# Patient Record
Sex: FEMALE | Race: WHITE | Employment: OTHER | URBAN - METROPOLITAN AREA
[De-identification: names, ages, dates, MRNs, and addresses within clinical notes are randomized per-mention and may not be internally consistent; named-entity substitution may affect disease eponyms.]

---

## 2017-08-09 ENCOUNTER — HOSPITAL ENCOUNTER (OUTPATIENT)
Dept: MAMMOGRAPHY | Age: 52
Discharge: HOME | End: 2017-08-09
Admitting: INTERNAL MEDICINE

## 2017-08-09 DIAGNOSIS — Z12.31 VISIT FOR SCREENING MAMMOGRAM: ICD-10-CM

## 2017-12-07 ENCOUNTER — OFFICE VISIT (OUTPATIENT)
Dept: ORTHOPEDIC SURGERY | Age: 52
End: 2017-12-07

## 2017-12-07 VITALS — HEIGHT: 63 IN | BODY MASS INDEX: 26.58 KG/M2 | WEIGHT: 150 LBS

## 2017-12-07 DIAGNOSIS — M54.16 LUMBAR RADICULOPATHY: Primary | ICD-10-CM

## 2017-12-07 DIAGNOSIS — M25.552 PAIN OF BOTH HIP JOINTS: ICD-10-CM

## 2017-12-07 DIAGNOSIS — M25.551 PAIN OF BOTH HIP JOINTS: ICD-10-CM

## 2017-12-07 DIAGNOSIS — R52 PAIN: ICD-10-CM

## 2017-12-07 PROCEDURE — 99243 OFF/OP CNSLTJ NEW/EST LOW 30: CPT | Performed by: ORTHOPAEDIC SURGERY

## 2017-12-07 RX ORDER — SUMATRIPTAN 100 MG/1
TABLET, FILM COATED ORAL
COMMUNITY
Start: 2017-11-28

## 2017-12-07 RX ORDER — FLUTICASONE PROPIONATE 50 MCG
2 SPRAY, SUSPENSION (ML) NASAL
COMMUNITY
Start: 2016-11-17 | End: 2021-07-13

## 2017-12-07 RX ORDER — ALPRAZOLAM 1 MG/1
1 TABLET ORAL
COMMUNITY
Start: 2017-07-06 | End: 2020-02-25

## 2017-12-07 RX ORDER — IBUPROFEN 600 MG/1
TABLET ORAL
COMMUNITY
Start: 2017-11-28 | End: 2020-09-14

## 2017-12-07 NOTE — PROGRESS NOTES
coordination. There is no weakness or sensory deficit. Left Hip Examination:    Inspection:  No erythema swelling or signs of infection    Palpation:  Tenderness to palpation of the posterior gluteus and piriformis muscles and ischial tuberosity    Range of Motion:  Full range of motion, forward flexion to 110 internal rotation to 20 external rotation to 50. Strength:  5/5 hip flexion and abduction and adduction    Special Tests:  Negative Sara's test.  Negative figure 4. Painful straight leg raise. negative strenchfield test    Skin: There are no rashes, ulcerations or lesions. Gait: Normal    Reflex 2+ patellar    Additional Comments:   Her lower back is tender to palpation. Ankle dorsiflexion and plantarflexion are intact sensation in her foot is intact    Additional Examinations:         Left Lower Extremity: Examination of the left lower extremity does not show any tenderness, deformity or injury. Range of motion is full with pain at terminal ends of internal rotation. There is no gross instability. There are no rashes, ulcerations or lesions. Strength and tone are normal.    Radiology:     X-rays obtained and reviewed in office:  Views 2  Location AP pelvis and lateral left hip  Impression there are no signs of arthritis fractures or dislocations in her hip. There does appear to be significant degenerative joint disease in her spine         Assessment :  Left greater than right Hip pain, which is likely coming from her spine    Impression:  Encounter Diagnoses   Name Primary?  Pain     Lumbar radiculopathy Yes       Office Procedures:  Orders Placed This Encounter   Procedures    Hip 2-3 Vw W Pelvis Left       Treatment Plan: We reviewed the diagnosis and treatment options. We recommended a course of therapeutic exercises, which she will continue as she is already doing physical therapy. She would not like to take any anti-inflammatory medications.   We discussed that her hip x-rays look normal and don't need any kind of surgery or intervention. We also discussed the x-ray is not the most sensitive test and that we could get an MRI of her hip if she would like. We will also like her to see on our spine doctors and evaluated. We will put in for an MRI of her hips and spine.   She is to get a spine MRI before seeing the spine doctors she will come back to see us if she gets a hip MRI to go over with her

## 2017-12-08 ENCOUNTER — TELEPHONE (OUTPATIENT)
Dept: ORTHOPEDIC SURGERY | Age: 52
End: 2017-12-08

## 2018-03-30 ENCOUNTER — TELEPHONE (OUTPATIENT)
Dept: ORTHOPEDIC SURGERY | Age: 53
End: 2018-03-30

## 2019-03-18 ENCOUNTER — HOSPITAL ENCOUNTER (OUTPATIENT)
Dept: WOMENS IMAGING | Age: 54
Discharge: HOME | End: 2019-03-18
Payer: COMMERCIAL

## 2019-03-18 DIAGNOSIS — Z12.31 ENCOUNTER FOR SCREENING MAMMOGRAM FOR MALIGNANT NEOPLASM OF BREAST: ICD-10-CM

## 2019-03-18 PROCEDURE — 77063 BREAST TOMOSYNTHESIS BI: CPT

## 2019-03-18 PROCEDURE — 77067 SCR MAMMO BI INCL CAD: CPT

## 2019-07-15 ENCOUNTER — TELEPHONE (OUTPATIENT)
Dept: ORTHOPEDIC SURGERY | Age: 54
End: 2019-07-15

## 2019-07-17 ENCOUNTER — TELEPHONE (OUTPATIENT)
Dept: ORTHOPEDIC SURGERY | Age: 54
End: 2019-07-17

## 2019-10-07 ENCOUNTER — HOSPITAL ENCOUNTER (EMERGENCY)
Age: 54
Discharge: HOME | End: 2019-10-07
Payer: COMMERCIAL

## 2019-10-07 VITALS
RESPIRATION RATE: 12 BRPM | WEIGHT: 159 LBS | OXYGEN SATURATION: 98 % | HEIGHT: 63 IN | BODY MASS INDEX: 28.17 KG/M2 | DIASTOLIC BLOOD PRESSURE: 90 MMHG | HEART RATE: 96 BPM | TEMPERATURE: 98.6 F | SYSTOLIC BLOOD PRESSURE: 155 MMHG

## 2019-10-07 DIAGNOSIS — M54.2 CHRONIC NECK PAIN: ICD-10-CM

## 2019-10-07 DIAGNOSIS — G89.29 CHRONIC NECK PAIN: ICD-10-CM

## 2019-10-07 DIAGNOSIS — S16.1XXA ACUTE STRAIN OF NECK MUSCLE, INITIAL ENCOUNTER: Primary | ICD-10-CM

## 2019-10-07 PROCEDURE — 6360000002 HC RX W HCPCS: Performed by: NURSE PRACTITIONER

## 2019-10-07 PROCEDURE — 96372 THER/PROPH/DIAG INJ SC/IM: CPT

## 2019-10-07 PROCEDURE — 99283 EMERGENCY DEPT VISIT LOW MDM: CPT

## 2019-10-07 RX ORDER — PAROXETINE 10 MG/1
10 TABLET, FILM COATED ORAL EVERY MORNING
COMMUNITY
End: 2020-02-24

## 2019-10-07 RX ORDER — ORPHENADRINE CITRATE 30 MG/ML
60 INJECTION INTRAMUSCULAR; INTRAVENOUS ONCE
Status: COMPLETED | OUTPATIENT
Start: 2019-10-07 | End: 2019-10-07

## 2019-10-07 RX ORDER — KETOROLAC TROMETHAMINE 30 MG/ML
30 INJECTION, SOLUTION INTRAMUSCULAR; INTRAVENOUS ONCE
Status: COMPLETED | OUTPATIENT
Start: 2019-10-07 | End: 2019-10-07

## 2019-10-07 RX ORDER — HYDROCODONE BITARTRATE AND ACETAMINOPHEN 5; 325 MG/1; MG/1
1 TABLET ORAL EVERY 6 HOURS PRN
Qty: 12 TABLET | Refills: 0 | Status: SHIPPED | OUTPATIENT
Start: 2019-10-07 | End: 2019-10-10

## 2019-10-07 RX ORDER — CYCLOBENZAPRINE HCL 10 MG
10 TABLET ORAL 3 TIMES DAILY PRN
Qty: 12 TABLET | Refills: 0 | Status: SHIPPED | OUTPATIENT
Start: 2019-10-07 | End: 2019-10-17

## 2019-10-07 RX ADMIN — KETOROLAC TROMETHAMINE 30 MG: 30 INJECTION, SOLUTION INTRAMUSCULAR at 17:15

## 2019-10-07 RX ADMIN — ORPHENADRINE CITRATE 60 MG: 30 INJECTION INTRAMUSCULAR; INTRAVENOUS at 17:15

## 2019-10-07 ASSESSMENT — PAIN DESCRIPTION - ONSET: ONSET: ON-GOING

## 2019-10-07 ASSESSMENT — PAIN DESCRIPTION - LOCATION
LOCATION: NECK
LOCATION: NECK

## 2019-10-07 ASSESSMENT — PAIN DESCRIPTION - PROGRESSION: CLINICAL_PROGRESSION: NOT CHANGED

## 2019-10-07 ASSESSMENT — PAIN DESCRIPTION - DESCRIPTORS: DESCRIPTORS: ACHING

## 2019-10-07 ASSESSMENT — PAIN SCALES - GENERAL
PAINLEVEL_OUTOF10: 9

## 2019-10-07 ASSESSMENT — PAIN DESCRIPTION - FREQUENCY: FREQUENCY: CONTINUOUS

## 2019-10-07 ASSESSMENT — PAIN DESCRIPTION - PAIN TYPE: TYPE: ACUTE PAIN

## 2019-10-15 ENCOUNTER — OFFICE VISIT (OUTPATIENT)
Dept: ORTHOPEDIC SURGERY | Age: 54
End: 2019-10-15
Payer: COMMERCIAL

## 2019-10-15 VITALS
HEART RATE: 96 BPM | SYSTOLIC BLOOD PRESSURE: 132 MMHG | DIASTOLIC BLOOD PRESSURE: 72 MMHG | WEIGHT: 158.95 LBS | BODY MASS INDEX: 28.16 KG/M2 | HEIGHT: 63 IN

## 2019-10-15 DIAGNOSIS — M72.2 PLANTAR FASCIITIS, BILATERAL: ICD-10-CM

## 2019-10-15 DIAGNOSIS — M79.671 PAIN OF RIGHT HEEL: Primary | ICD-10-CM

## 2019-10-15 DIAGNOSIS — M79.672 PAIN OF LEFT HEEL: ICD-10-CM

## 2019-10-15 PROCEDURE — 99203 OFFICE O/P NEW LOW 30 MIN: CPT | Performed by: PODIATRIST

## 2019-10-15 PROCEDURE — L3040 FT ARCH SUPRT PREMOLD LONGIT: HCPCS | Performed by: PODIATRIST

## 2019-10-15 RX ORDER — IBUPROFEN 600 MG/1
600 TABLET ORAL
COMMUNITY
Start: 2017-11-28 | End: 2020-02-24

## 2019-10-15 RX ORDER — SUMATRIPTAN 100 MG/1
TABLET, FILM COATED ORAL
COMMUNITY
Start: 2017-11-28 | End: 2020-02-24

## 2019-11-07 ENCOUNTER — TELEPHONE (OUTPATIENT)
Dept: ORTHOPEDIC SURGERY | Age: 54
End: 2019-11-07

## 2020-02-24 ENCOUNTER — OFFICE VISIT (OUTPATIENT)
Dept: FAMILY MEDICINE CLINIC | Age: 55
End: 2020-02-24
Payer: COMMERCIAL

## 2020-02-24 VITALS
OXYGEN SATURATION: 99 % | TEMPERATURE: 98.2 F | WEIGHT: 166 LBS | HEIGHT: 63 IN | DIASTOLIC BLOOD PRESSURE: 60 MMHG | HEART RATE: 108 BPM | SYSTOLIC BLOOD PRESSURE: 108 MMHG | BODY MASS INDEX: 29.41 KG/M2

## 2020-02-24 PROBLEM — Z79.899 CHRONICALLY ON BENZODIAZEPINE THERAPY: Status: ACTIVE | Noted: 2020-02-24

## 2020-02-24 PROBLEM — M79.7 FIBROMYALGIA: Status: ACTIVE | Noted: 2020-02-24

## 2020-02-24 PROCEDURE — 99204 OFFICE O/P NEW MOD 45 MIN: CPT | Performed by: FAMILY MEDICINE

## 2020-02-24 PROCEDURE — G0444 DEPRESSION SCREEN ANNUAL: HCPCS | Performed by: FAMILY MEDICINE

## 2020-02-24 PROCEDURE — G8431 POS CLIN DEPRES SCRN F/U DOC: HCPCS | Performed by: FAMILY MEDICINE

## 2020-02-24 RX ORDER — PAROXETINE HYDROCHLORIDE 40 MG/1
TABLET, FILM COATED ORAL
COMMUNITY
Start: 2020-01-12 | End: 2020-09-14

## 2020-02-24 RX ORDER — METOPROLOL SUCCINATE 25 MG/1
TABLET, EXTENDED RELEASE ORAL
COMMUNITY
Start: 2020-01-12 | End: 2020-02-24

## 2020-02-24 SDOH — ECONOMIC STABILITY: TRANSPORTATION INSECURITY
IN THE PAST 12 MONTHS, HAS THE LACK OF TRANSPORTATION KEPT YOU FROM MEDICAL APPOINTMENTS OR FROM GETTING MEDICATIONS?: NO

## 2020-02-24 SDOH — HEALTH STABILITY: MENTAL HEALTH
STRESS IS WHEN SOMEONE FEELS TENSE, NERVOUS, ANXIOUS, OR CAN'T SLEEP AT NIGHT BECAUSE THEIR MIND IS TROUBLED. HOW STRESSED ARE YOU?: VERY MUCH

## 2020-02-24 SDOH — ECONOMIC STABILITY: TRANSPORTATION INSECURITY
IN THE PAST 12 MONTHS, HAS LACK OF TRANSPORTATION KEPT YOU FROM MEETINGS, WORK, OR FROM GETTING THINGS NEEDED FOR DAILY LIVING?: NO

## 2020-02-24 SDOH — ECONOMIC STABILITY: FOOD INSECURITY: WITHIN THE PAST 12 MONTHS, YOU WORRIED THAT YOUR FOOD WOULD RUN OUT BEFORE YOU GOT MONEY TO BUY MORE.: NEVER TRUE

## 2020-02-24 SDOH — HEALTH STABILITY: PHYSICAL HEALTH: ON AVERAGE, HOW MANY DAYS PER WEEK DO YOU ENGAGE IN MODERATE TO STRENUOUS EXERCISE (LIKE A BRISK WALK)?: 0 DAYS

## 2020-02-24 SDOH — HEALTH STABILITY: PHYSICAL HEALTH: ON AVERAGE, HOW MANY MINUTES DO YOU ENGAGE IN EXERCISE AT THIS LEVEL?: 0 MIN

## 2020-02-24 SDOH — ECONOMIC STABILITY: FOOD INSECURITY: WITHIN THE PAST 12 MONTHS, THE FOOD YOU BOUGHT JUST DIDN'T LAST AND YOU DIDN'T HAVE MONEY TO GET MORE.: NEVER TRUE

## 2020-02-24 SDOH — ECONOMIC STABILITY: INCOME INSECURITY: HOW HARD IS IT FOR YOU TO PAY FOR THE VERY BASICS LIKE FOOD, HOUSING, MEDICAL CARE, AND HEATING?: NOT HARD AT ALL

## 2020-02-24 ASSESSMENT — ANXIETY QUESTIONNAIRES
GAD7 TOTAL SCORE: 10
2. NOT BEING ABLE TO STOP OR CONTROL WORRYING: 1-SEVERAL DAYS
7. FEELING AFRAID AS IF SOMETHING AWFUL MIGHT HAPPEN: 2-OVER HALF THE DAYS
4. TROUBLE RELAXING: 1-SEVERAL DAYS
5. BEING SO RESTLESS THAT IT IS HARD TO SIT STILL: 0-NOT AT ALL
3. WORRYING TOO MUCH ABOUT DIFFERENT THINGS: 1-SEVERAL DAYS
6. BECOMING EASILY ANNOYED OR IRRITABLE: 2-OVER HALF THE DAYS
1. FEELING NERVOUS, ANXIOUS, OR ON EDGE: 3-NEARLY EVERY DAY

## 2020-02-24 ASSESSMENT — ENCOUNTER SYMPTOMS
WHEEZING: 0
BLOOD IN STOOL: 0
ABDOMINAL PAIN: 1
DIARRHEA: 1
RHINORRHEA: 0
CONSTIPATION: 1
COLOR CHANGE: 0
SORE THROAT: 0
SHORTNESS OF BREATH: 0
BACK PAIN: 1

## 2020-02-24 ASSESSMENT — PATIENT HEALTH QUESTIONNAIRE - PHQ9
8. MOVING OR SPEAKING SO SLOWLY THAT OTHER PEOPLE COULD HAVE NOTICED. OR THE OPPOSITE, BEING SO FIGETY OR RESTLESS THAT YOU HAVE BEEN MOVING AROUND A LOT MORE THAN USUAL: 1
10. IF YOU CHECKED OFF ANY PROBLEMS, HOW DIFFICULT HAVE THESE PROBLEMS MADE IT FOR YOU TO DO YOUR WORK, TAKE CARE OF THINGS AT HOME, OR GET ALONG WITH OTHER PEOPLE: 3
1. LITTLE INTEREST OR PLEASURE IN DOING THINGS: 3
SUM OF ALL RESPONSES TO PHQ QUESTIONS 1-9: 21
4. FEELING TIRED OR HAVING LITTLE ENERGY: 3
SUM OF ALL RESPONSES TO PHQ QUESTIONS 1-9: 21
SUM OF ALL RESPONSES TO PHQ9 QUESTIONS 1 & 2: 6
5. POOR APPETITE OR OVEREATING: 3
2. FEELING DOWN, DEPRESSED OR HOPELESS: 3
7. TROUBLE CONCENTRATING ON THINGS, SUCH AS READING THE NEWSPAPER OR WATCHING TELEVISION: 2
6. FEELING BAD ABOUT YOURSELF - OR THAT YOU ARE A FAILURE OR HAVE LET YOURSELF OR YOUR FAMILY DOWN: 3
3. TROUBLE FALLING OR STAYING ASLEEP: 3
9. THOUGHTS THAT YOU WOULD BE BETTER OFF DEAD, OR OF HURTING YOURSELF: 0

## 2020-02-24 NOTE — PROGRESS NOTES
deficit. Psychiatric:         Mood and Affect: Mood is anxious and depressed. Affect is labile. Speech: Speech is rapid and pressured and tangential.         Behavior: Behavior is hyperactive. Thought Content: Thought content does not include homicidal or suicidal ideation. ASSESSMENT/PLAN:  Ramez Nolan is a 63 y/o female here to establish care and for chronic medical problems of anxiety/depression and PTSD, insomnia, IBS with diarrhea and constipation, chronic back pain and fibromyalgia, and chronic migraines without arua. 1. Encounter to establish care  -chart reviewed, health maintenance updated, history and physical performed, chronic conditions addressed    2. Anxiety and depression  3. Positive depression screening  4. Post traumatic stress disorder  5. Psychophysiological insomnia  6. Chronically on benzodiazepine therapy  7. Encounter for drug screening  - Positive Screen for Clinical Depression with a Documented Follow-up Plan   On the basis of positive PHQ-9 screening (PHQ-9 Total Score: 21), the following plan was implemented: medication prescribed: Paxil- 40 mg daily for mood and Xanax 1 mg HS to be Rx- patient will call for any significant medication side effects or worsening symptoms of depression.   Patient will follow-up in 3 month(s) with PCP.  - DRUG SCREEN MULTI URINE  -continue paxil 40 mg daily; consider behavioral health in future  -CDA/UDS today  -continue Xanax 1 mg HS for sleep if UDS appropriate  PDMP Monitoring:    Last PDMP [address] as Reviewed [facility]):  Review User Review Instant Review Result   Jen Jin 2/24/2020  9:53 PM Reviewed PDMP [1]     Last Controlled Substance Monitoring Documentation      Office Visit from 2/24/2020 in [address] [place] [address] [place]   Periodic Controlled Substance Monitoring  Possible medication side effects, risk of tolerance/dependence & alternative treatments discussed., No signs of potential drug abuse or

## 2020-02-24 NOTE — LETTER
disease. Overdose or dangerous interactions with alcohol and other medications may occur, leading to death. Hyperalgesia may develop, in which patients receiving opioids for the treatment of pain may actually become more sensitive to certain painful stimuli, and in some cases, experience pain from ordinarily non-painful stimuli. Women between the ages of 14-53 who could become pregnant should carefully weigh the risks and benefits of opioids with their physicians, as these medications increase the risk of pregnancy complications, including miscarriage,  delivery and stillbirth. It is also possible for babies to be born addicted to opioids. Opioid dependence withdrawal symptoms may include; feelings of uneasiness, increased pain, irritability, belly pain, diarrhea, sweats and goose-flesh. Benzodiazepines and non-benzodiazepine sleep medications: These medications can lead to problems such as addiction/dependence, sedation, fatigue, lightheadedness, dizziness, incoordination, falls, depression, hallucinations, and impaired judgment, memory and concentration. The ability to drive and operate machinery may also be affected. Abnormal sleep-related behaviors have been reported, including sleep walking, driving, making telephone calls, eating, or having sex while not fully awake. These medications can suppress breathing and worsen sleep apnea, particularly when combined with alcohol or other sedating medications, potentially leading to death. Dependence withdrawal symptoms may include tremors, anxiety, hallucinations and seizures. Stimulants:  Common adverse effects include addiction/dependence, increased blood pressure and heart rate, decreased appetite, nausea, involuntary weight loss, insomnia, irritability, and headaches.   These risks may increase when these medications are combined with other stimulants, such as caffeine pills or energy drinks, certain weight loss supplements and oral decongestants. Dependence withdrawal symptoms may include depressed mood, loss of interest, suicidal thoughts, anxiety, fatigue, appetite changes and agitation. Testosterone replacement therapy:  Potential side effects include increased risk of stroke and heart attack, blood clots, increased blood pressure, increased cholesterol, enlarged prostate, sleep apnea, irritability/aggression and other mood disorders, and decreased fertility. Other:     1. I understand that I have the following responsibilities:  · I will take medications at the dose and frequency prescribed. · I will not increase or change how I take my medications without the approval of the health care provider who signs this Medication Agreement. · I will arrange for refills at the prescribed interval ONLY during regular office hours. I will not ask for refills earlier than agreed, after-hours, on holidays or on weekends. · I will obtain all refills for these medications at  [place] [address], [address] [phone] (Phone)     ·  with full consent for my provider and pharmacist to exchange information in writing or verbally. · I will not request any pain medications or controlled substances from other providers and will inform this provider of all other medications I am taking. · I will inform my other health care providers that I am taking these medications and of the existence of this [id number]. In the event of an emergency, I will provide the same information to the emergency department providers. · I will protect my prescriptions and medications. I understand that lost or misplaced prescriptions will not be replaced. · I will keep medications only for my own use and will not share them with others. I will keep all medications away from children.   · I agree to participate in any medical, psychological or psychiatric

## 2020-02-24 NOTE — PATIENT INSTRUCTIONS
Get urine drug screen  Sign controlled drug agreement  Continue current medications  Follow up in 3 months for med check/follow up

## 2020-02-25 LAB
AMPHETAMINE SCREEN, URINE: NORMAL
BARBITURATE SCREEN URINE: NORMAL
BENZODIAZEPINE SCREEN, URINE: NORMAL
CANNABINOID SCREEN URINE: NORMAL
COCAINE METABOLITE SCREEN URINE: NORMAL
Lab: NORMAL
METHADONE SCREEN, URINE: NORMAL
OPIATE SCREEN URINE: NORMAL
OXYCODONE URINE: NORMAL
PH UA: 5
PHENCYCLIDINE SCREEN URINE: NORMAL
PROPOXYPHENE SCREEN: NORMAL

## 2020-02-25 RX ORDER — ALPRAZOLAM 1 MG/1
1 TABLET ORAL NIGHTLY PRN
Qty: 30 TABLET | Refills: 0 | Status: SHIPPED | OUTPATIENT
Start: 2020-02-25 | End: 2020-03-26

## 2020-02-25 RX ORDER — ALPRAZOLAM 1 MG/1
1 TABLET ORAL NIGHTLY PRN
Qty: 30 TABLET | Refills: 0 | Status: CANCELLED | OUTPATIENT
Start: 2020-02-25 | End: 2020-03-26

## 2020-04-27 ENCOUNTER — VIRTUAL VISIT (OUTPATIENT)
Dept: FAMILY MEDICINE CLINIC | Age: 55
End: 2020-04-27
Payer: COMMERCIAL

## 2020-04-27 ENCOUNTER — TELEPHONE (OUTPATIENT)
Dept: FAMILY MEDICINE CLINIC | Age: 55
End: 2020-04-27

## 2020-04-27 VITALS — BODY MASS INDEX: 27.83 KG/M2 | TEMPERATURE: 97.6 F | HEIGHT: 64 IN | WEIGHT: 163 LBS

## 2020-04-27 PROCEDURE — 99213 OFFICE O/P EST LOW 20 MIN: CPT | Performed by: FAMILY MEDICINE

## 2020-04-27 RX ORDER — ALPRAZOLAM 1 MG/1
TABLET ORAL
COMMUNITY
Start: 2020-04-21 | End: 2020-06-25

## 2020-04-27 RX ORDER — PAROXETINE 10 MG/1
TABLET, FILM COATED ORAL
Qty: 30 TABLET | Refills: 3 | Status: SHIPPED | OUTPATIENT
Start: 2020-04-27 | End: 2020-09-14

## 2020-04-27 RX ORDER — ERYTHROMYCIN 5 MG/G
OINTMENT OPHTHALMIC
Qty: 3.5 G | Refills: 0 | Status: SHIPPED | OUTPATIENT
Start: 2020-04-27 | End: 2020-05-07

## 2020-04-27 ASSESSMENT — ENCOUNTER SYMPTOMS
VOMITING: 0
ABDOMINAL PAIN: 0
SORE THROAT: 0
CONSTIPATION: 0
EYE REDNESS: 1
DIARRHEA: 0
EYE ITCHING: 1
RHINORRHEA: 0
EYE DISCHARGE: 1
NAUSEA: 0
SHORTNESS OF BREATH: 0

## 2020-04-27 NOTE — PROGRESS NOTES
activity     Days per week: 0 days     Minutes per session: 0 min    Stress: Very much   Relationships    Social connections     Talks on phone: Not on file     Gets together: Not on file     Attends Restoration[employer] service: Not on file     Active member of club or organization: Not on file     Attends meetings of clubs or organizations: Not on file     Relationship status: Not on file    Intimate partner violence     Fear of current or ex partner: Not on file     Emotionally abused: Not on file     Physically abused: Not on file     Forced sexual activity: Not on file   Other Topics Concern    Not on file   Social History Narrative    Suicide in family    [de-identified], aunts, cousin    Other cousin 4 attempts    Sister made 3 attempts    Relatives committed to amairani     38 years. With  39 years    Son 39 y/o Linsey Maradiaga         There is no immunization history on file for this patient. Past medical, surgical, and social history reviewed and updated. Medications, immunizations, and allergies reviewed and updated     Review of Systems   Constitutional: Negative for appetite change, chills, fatigue and fever. HENT: Negative for congestion, rhinorrhea and sore throat. Eyes: Positive for discharge, redness and itching. Negative for visual disturbance. Respiratory: Negative for shortness of breath. Cardiovascular: Negative for chest pain. Gastrointestinal: Negative for abdominal pain, constipation, diarrhea, nausea and vomiting. Genitourinary: Negative for dysuria and hematuria. Skin: Negative for rash. Psychiatric/Behavioral: Positive for agitation, dysphoric mood and sleep disturbance. The patient is nervous/anxious. OBJECTIVE:  Temp 97.6 °F (36.4 °C) Comment: patient reported  Ht 5' 3.5\" (1.613 m) Comment: patient reported  Wt 163 lb (73.9 kg) Comment: patient reported  BMI 28.42 kg/m²     Physical Exam  Constitutional:       Appearance: She is ill-appearing.    HENT:      Head:

## 2020-05-22 ENCOUNTER — TELEPHONE (OUTPATIENT)
Dept: FAMILY MEDICINE CLINIC | Age: 55
End: 2020-05-22

## 2020-06-17 ENCOUNTER — TELEPHONE (OUTPATIENT)
Dept: FAMILY MEDICINE CLINIC | Age: 55
End: 2020-06-17

## 2020-06-25 RX ORDER — ALPRAZOLAM 1 MG/1
TABLET ORAL
Qty: 30 TABLET | Refills: 0 | Status: SHIPPED | OUTPATIENT
Start: 2020-06-25 | End: 2020-08-24

## 2020-06-26 ENCOUNTER — TELEPHONE (OUTPATIENT)
Dept: FAMILY MEDICINE CLINIC | Age: 55
End: 2020-06-26

## 2020-07-22 ENCOUNTER — TELEPHONE (OUTPATIENT)
Dept: FAMILY MEDICINE CLINIC | Age: 55
End: 2020-07-22

## 2020-07-22 NOTE — TELEPHONE ENCOUNTER
Patient called in and is requesting a call back to get rescheduled. Patient did not know that she had an appointment scheduled yesterday and was not able to wait on hold to get through to the office. Please advise.

## 2020-07-23 ENCOUNTER — VIRTUAL VISIT (OUTPATIENT)
Dept: FAMILY MEDICINE CLINIC | Age: 55
End: 2020-07-23
Payer: COMMERCIAL

## 2020-07-23 PROCEDURE — 99213 OFFICE O/P EST LOW 20 MIN: CPT | Performed by: PHYSICIAN ASSISTANT

## 2020-07-23 RX ORDER — GUAIFENESIN 600 MG/1
TABLET, EXTENDED RELEASE ORAL
Qty: 30 TABLET | Refills: 0 | Status: SHIPPED | OUTPATIENT
Start: 2020-07-23 | End: 2021-07-13

## 2020-07-23 RX ORDER — DOXYCYCLINE HYCLATE 100 MG/1
100 CAPSULE ORAL 2 TIMES DAILY
Qty: 14 CAPSULE | Refills: 0 | Status: SHIPPED | OUTPATIENT
Start: 2020-07-23 | End: 2020-07-30

## 2020-07-23 NOTE — PROGRESS NOTES
2020    TELEHEALTH EVALUATION -- Audio/Visual (During [facility]-88 public health emergency)    HPI:    Mariano Smith (:  1965) has requested an audio/video evaluation for the following concern(s): With acute on chronic sinusitis. Sinus headache, maxillary sinus pressure, mucous green nasal exudates, malaise and fatigue x10 days. Gets a sinus infection about once a year and usually prescribed Clare Seen but one usually does not cure it. States her body is medication resistant. Tried sinus meds OTC or her migraine med. With ear pressure, has a dry itchy cough. Review of Systems No fever or chills. No vision changes. No dyspnea or chest pain. Prior to Visit Medications    Medication Sig Taking? Authorizing Provider   doxycycline hyclate (VIBRAMYCIN) 100 MG capsule Take 1 capsule by mouth 2 times daily for 7 days Yes THELMA Do   guaiFENesin (MUCINEX) 600 MG extended release tablet 1-2 tabs every 12 hours as needed for congestion. Increase water intake with this medication. Yes [address], [address]   ALPRAZolam (XANAX) 1 MG tablet TAKE 1 TABLET BY MOUTH EVERY NIGHT AS NEEDED FOR SLEEP OR ANXIETY Yes Shayla Santizo., DO   PARoxetine (PAXIL) 10 MG tablet TAKE 1 TABLET DAILY WITH YOUR 40 MG TABLET FOR 50 MG TOTAL Yes Shayla Santizo., DO   PARoxetine (PAXIL) 40 MG tablet TK 1 T PO  QAM Yes Historical Provider, MD   fluticasone (FLONASE) 50 MCG/ACT nasal spray 2 sprays by Nasal route Yes Historical Provider, MD   SUMAtriptan (IMITREX) 100 MG tablet Take 1 tab at onset of migraine. Can take second tab 2 hours later if needed.  Yes Historical Provider, MD   ibuprofen (ADVIL;MOTRIN) 600 MG tablet TAKE 1 TABLET BY MOUTH EVERY 6 HOURS AS NEEDED FOR PAIN  Historical Provider, MD       Social History     Tobacco Use    Smoking status: Former Smoker     Types: Cigarettes     Start date: 1974     Last attempt to quit: 2004     Years since quittin.5    Smokeless tobacco: Never Used Substance Use Topics    Alcohol use: Not Currently    Drug use: Not Currently        Allergies   Allergen Reactions    Latex     Codeine Anaphylaxis    Lidocaine     Meperidine Anaphylaxis    Diphenhydramine Other (See Comments)     May be associated with pancreatitis attacks in this patient. Usually with higher doses    Iodinated Diagnostic Agents Nausea Only and Other (See Comments)     Bee stings, body aches      Prednisone     Trazodone Other (See Comments)     pancreatitis   ,   Past Medical History:   Diagnosis Date    Anxiety and depression     Degenerative disc disease, lumbar     IBS (irritable bowel syndrome)     Insomnia     Lumbar disc disease     Migraines     Post traumatic stress disorder    ,   Past Surgical History:   Procedure Laterality Date    HEEL SPUR SURGERY      LUMBAR DISCECTOMY      TOENAIL EXCISION      TONSILLECTOMY      TUBAL LIGATION         PHYSICAL EXAMINATION:    Patient-Reported Vitals 7/23/2020   Patient-Reported Weight 160 lb   Patient-Reported Height 5 4   Patient-Reported Temperature 98.4      Constitutional: [x] Appears well-developed and well-nourished [x] No apparent distress      [] Abnormal-   Mental status  [x] Alert and awake  [x] Oriented to person/place/time [x]Able to follow commands      Eyes:  EOM    [x]  Normal  [] Abnormal-  Sclera  [x]  Normal  [] Abnormal -         Discharge [x]  None visible  [] Abnormal -    HENT:   [x] Normocephalic, atraumatic. [x] Abnormal  Sounds nasally as if congested.   [x] Mouth/Throat: Mucous membranes are moist.     External Ears [x] Normal  [] Abnormal-     Neck: [x] No visualized mass     Pulmonary/Chest: [x] Respiratory effort normal.  [x] No visualized signs of difficulty breathing or respiratory distress        [] Abnormal-      Musculoskeletal:   [] Normal gait with no signs of ataxia         [x] Normal range of motion of neck        [] Abnormal-       Neurological:        [] No Facial Asymmetry (Cranial nerve 7 motor function) (limited exam to video visit)          [x] No gaze palsy        [] Abnormal-         Skin:        [x] No significant exanthematous lesions or discoloration noted on facial skin         [] Abnormal-            Psychiatric:       [x] Normal Affect [] No Hallucinations        [] Abnormal-     Other pertinent observable physical exam findings-tenderness elicited when she palpates maxillary sinuses. ASSESSMENT/PLAN:  1. Acute on chronic bacterial sinusitis with  Chronic allergic rhinitis  - start antibiotics and mucinex. - increase water intake  - suggested nasal saline rinses    2. HEALTH MAINTENANCE:  She explained her long history of medical issues including chronic back pain, mental health issues, pancreatitis,  and said she will obtain records. Has routine appt 8/18/2020. Return if symptoms worsen or fail to improve. Bethany Baron is a 62 y.o. female being evaluated by a Virtual Visit (video visit) encounter to address concerns as mentioned above. A caregiver was present when appropriate. Due to this being a TeleHealth encounter (During [facility] public health emergency), evaluation of the following organ systems was limited: Vitals/Constitutional/EENT/Resp/CV/GI//MS/Neuro/Skin/Heme-Lymph-Imm. Pursuant to the emergency declaration under the [facility], [address] [place] [address] authority and the [employer] and [employer] Act, this Virtual Visit was conducted with patient's (and/or legal guardian's) consent, to reduce the patient's risk of exposure to COVID-19 and provide necessary medical care. The patient (and/or legal guardian) has also been advised to contact this office for worsening conditions or problems, and seek emergency medical treatment and/or call 911 if deemed necessary.      Patient identification was verified at the start of the visit: Yes    Total time spent on this encounter: 30    Services were

## 2020-08-04 ENCOUNTER — TELEPHONE (OUTPATIENT)
Dept: FAMILY MEDICINE CLINIC | Age: 55
End: 2020-08-04

## 2020-08-04 NOTE — TELEPHONE ENCOUNTER
Pt called and states that she had a VV with Carmen on 7/23/20. She was prescribed doxycylcine for her sinus infection. She states that she has finished the script and it did not work. She is wanting to know if a zpak can be called in for her.  Please call pt to advise [phone]

## 2020-08-04 NOTE — TELEPHONE ENCOUNTER
Pt states she can't do an e vist or doxy now. She is getting ready to turn her phone in to be sent off to be fixed. She has no other way to do a visit. I asked if she could use someone else phone. She stated she couldn't. Now having dizziness. Do you want pt to be seen at [facility]?

## 2020-08-04 NOTE — TELEPHONE ENCOUNTER
Patient returned call. She has a sinus disease and gets the sinus infections once a year. She now has the following symptoms:  Raspy voice, sinus pressure, headache, coughing, yellow mucus, dizziness. She is requesting a Z nilsa as that has helped in the past.  She is medication resistant and some medications will not  Work. She said you could call her pharmacy and confirm that receiving the Z nilsa has been used in the past.   Another appointment for Z nilsa? Call on cell phone within 30 minutes, or leave message on home phone after 30 minutes. She is having cell phone difficulty.

## 2020-08-24 RX ORDER — ALPRAZOLAM 1 MG/1
TABLET ORAL
Qty: 30 TABLET | Refills: 2 | Status: SHIPPED | OUTPATIENT
Start: 2020-08-24 | End: 2020-12-08

## 2020-08-24 NOTE — TELEPHONE ENCOUNTER
Mariano Smiht is requesting refill(s) alprazolam  Last OV 7/23/20 (pertaining to medication)  LR 6/25/20 (per medication requested)  Next office visit scheduled or attempted No   If no, reason:

## 2020-09-14 ENCOUNTER — VIRTUAL VISIT (OUTPATIENT)
Dept: FAMILY MEDICINE CLINIC | Age: 55
End: 2020-09-14
Payer: COMMERCIAL

## 2020-09-14 PROCEDURE — 99214 OFFICE O/P EST MOD 30 MIN: CPT | Performed by: FAMILY MEDICINE

## 2020-09-14 PROCEDURE — G0444 DEPRESSION SCREEN ANNUAL: HCPCS | Performed by: FAMILY MEDICINE

## 2020-09-14 PROCEDURE — G8431 POS CLIN DEPRES SCRN F/U DOC: HCPCS | Performed by: FAMILY MEDICINE

## 2020-09-14 RX ORDER — PAROXETINE HYDROCHLORIDE 20 MG/1
1 TABLET, FILM COATED ORAL DAILY
COMMUNITY
Start: 2020-08-16 | End: 2020-09-14 | Stop reason: DRUGHIGH

## 2020-09-14 RX ORDER — ALPRAZOLAM 1 MG/1
TABLET ORAL
Qty: 30 TABLET | Refills: 2 | Status: CANCELLED | OUTPATIENT
Start: 2020-09-14

## 2020-09-14 RX ORDER — PAROXETINE HYDROCHLORIDE 40 MG/1
40 TABLET, FILM COATED ORAL EVERY MORNING
Qty: 90 TABLET | Refills: 1 | Status: SHIPPED | OUTPATIENT
Start: 2020-09-14 | End: 2021-03-11 | Stop reason: SDUPTHER

## 2020-09-14 ASSESSMENT — PATIENT HEALTH QUESTIONNAIRE - PHQ9
4. FEELING TIRED OR HAVING LITTLE ENERGY: 3
9. THOUGHTS THAT YOU WOULD BE BETTER OFF DEAD, OR OF HURTING YOURSELF: 0
8. MOVING OR SPEAKING SO SLOWLY THAT OTHER PEOPLE COULD HAVE NOTICED. OR THE OPPOSITE, BEING SO FIGETY OR RESTLESS THAT YOU HAVE BEEN MOVING AROUND A LOT MORE THAN USUAL: 0
6. FEELING BAD ABOUT YOURSELF - OR THAT YOU ARE A FAILURE OR HAVE LET YOURSELF OR YOUR FAMILY DOWN: 3
SUM OF ALL RESPONSES TO PHQ9 QUESTIONS 1 & 2: 6
SUM OF ALL RESPONSES TO PHQ QUESTIONS 1-9: 21
10. IF YOU CHECKED OFF ANY PROBLEMS, HOW DIFFICULT HAVE THESE PROBLEMS MADE IT FOR YOU TO DO YOUR WORK, TAKE CARE OF THINGS AT HOME, OR GET ALONG WITH OTHER PEOPLE: 3
7. TROUBLE CONCENTRATING ON THINGS, SUCH AS READING THE NEWSPAPER OR WATCHING TELEVISION: 3
3. TROUBLE FALLING OR STAYING ASLEEP: 3
SUM OF ALL RESPONSES TO PHQ QUESTIONS 1-9: 21
2. FEELING DOWN, DEPRESSED OR HOPELESS: 3
5. POOR APPETITE OR OVEREATING: 3
1. LITTLE INTEREST OR PLEASURE IN DOING THINGS: 3

## 2020-09-14 ASSESSMENT — ANXIETY QUESTIONNAIRES
3. WORRYING TOO MUCH ABOUT DIFFERENT THINGS: 3-NEARLY EVERY DAY
6. BECOMING EASILY ANNOYED OR IRRITABLE: 3-NEARLY EVERY DAY
7. FEELING AFRAID AS IF SOMETHING AWFUL MIGHT HAPPEN: 3-NEARLY EVERY DAY
4. TROUBLE RELAXING: 3-NEARLY EVERY DAY
5. BEING SO RESTLESS THAT IT IS HARD TO SIT STILL: 1-SEVERAL DAYS
2. NOT BEING ABLE TO STOP OR CONTROL WORRYING: 3-NEARLY EVERY DAY
GAD7 TOTAL SCORE: 19
1. FEELING NERVOUS, ANXIOUS, OR ON EDGE: 3-NEARLY EVERY DAY

## 2020-09-14 ASSESSMENT — ENCOUNTER SYMPTOMS
SORE THROAT: 0
ABDOMINAL PAIN: 0
RHINORRHEA: 0
BLOOD IN STOOL: 0
SHORTNESS OF BREATH: 0
CONSTIPATION: 0
DIARRHEA: 0

## 2020-09-14 ASSESSMENT — COLUMBIA-SUICIDE SEVERITY RATING SCALE - C-SSRS
2. HAVE YOU ACTUALLY HAD ANY THOUGHTS OF KILLING YOURSELF?: NO
1. WITHIN THE PAST MONTH, HAVE YOU WISHED YOU WERE DEAD OR WISHED YOU COULD GO TO SLEEP AND NOT WAKE UP?: NO
6. HAVE YOU EVER DONE ANYTHING, STARTED TO DO ANYTHING, OR PREPARED TO DO ANYTHING TO END YOUR LIFE?: NO

## 2020-09-14 NOTE — PROGRESS NOTES
that is scares her to death  Has a lot on her plate trying to deal with medical stuff  Her son has some doctor's appointments coming up, not sure if they'll keep them or help him but she hopes he does. To be able to  front of her 44 y/o son  Feels helpless because she can't help herself or her family    Hip pain:  Severe hip issue:  Left side  Chronic, 15+  Years, can't walk for >10 mins w/o rest  Cannot lay on right or left side due to compensation  SI joint left hip continues to keep popping out of place  Won't stay in place  Roberto through injections in June 2020, got ill from injections  Blood work went all crazy per patient  Pain is so excruciating; can't take it much longer  Has to wait until first of the year until she get her test done  Saw Dr. Yolette Lamar, an orthopedist, did x-ray, told her no arthritis at that time  Told her if continue to have problems can MRI but thinks it is connected to back.   Got injections, 28 into her back in 6 months  Nerve blocks, steroids, epidurals, didn't work  Was really bad off sick from all of that  This time been trying to make this work until January to start over  When back to Dr. June Herron dropped and told her he thinks what was wrong  Told her it wasn't SI joint, did US, told her tendinoapthy of left gluteus mediaus tendon  Called runner's butt per patient  Saw chips that looked like bone material and arthritis is in the hip      Past Medical History:   Diagnosis Date    Anxiety and depression     Degenerative disc disease, lumbar     IBS (irritable bowel syndrome)     Insomnia     Lumbar disc disease     Migraines     Post traumatic stress disorder      Past Surgical History:   Procedure Laterality Date    HEEL SPUR SURGERY      LUMBAR DISCECTOMY      TOENAIL EXCISION      TONSILLECTOMY      TUBAL LIGATION       Current Outpatient Medications   Medication Sig Dispense Refill    PARoxetine (PAXIL) 40 MG tablet Take 1 tablet by mouth every morning 90 tablet 1    ALPRAZolam (XANAX) 1 MG tablet TAKE 1 TABLET BY MOUTH EVERY NIGHT AS NEEDED FOR SLEEP OR ANXIETY 30 tablet 2    guaiFENesin (MUCINEX) 600 MG extended release tablet 1-2 tabs every 12 hours as needed for congestion. Increase water intake with this medication. 30 tablet 0    fluticasone (FLONASE) 50 MCG/ACT nasal spray 2 sprays by Nasal route      SUMAtriptan (IMITREX) 100 MG tablet Take 1 tab at onset of migraine. Can take second tab 2 hours later if needed. No current facility-administered medications for this visit. Allergies   Allergen Reactions    Latex     Codeine Anaphylaxis    Lidocaine     Meperidine Anaphylaxis    Diphenhydramine Other (See Comments)     May be associated with pancreatitis attacks in this patient.   Usually with higher doses    Iodinated Diagnostic Agents Nausea Only and Other (See Comments)     Bee stings, body aches      Prednisone     Trazodone Other (See Comments)     pancreatitis     Family History   Problem Relation Age of Onset    Depression Mother     Anxiety Disorder Mother     Alzheimer's Disease Mother     Seizures Mother     Alzheimer's Disease Father     Heart Failure Father     Depression Father     Anxiety Disorder Father      Social History     Socioeconomic History    Marital status:      Spouse name: Brandy Toney    Number of children: 1    Years of education: 15    Highest education level: 11th grade   Occupational History    Not on file   Social Needs    Financial resource strain: Not hard at all   [employer] insecurity     Worry: Never true     Inability: Never true   [employer] needs     Medical: No     Non-medical: No   Tobacco Use    Smoking status: Former Smoker     Packs/day: 1.00     Years: 30.00     Pack years: 30.00     Types: Cigarettes     Start date: 1974     Last attempt to quit: 2004     Years since quittin.7    Smokeless tobacco: Never Used   Substance and Sexual Activity    Alcohol use: Not Currently    Drug use: Not Currently    Sexual activity: Yes     Partners: Male     Birth control/protection: Surgical   Lifestyle    Physical activity     Days per week: 0 days     Minutes per session: 0 min    Stress: Very much   Relationships    Social connections     Talks on phone: Not on file     Gets together: Not on file     Attends Hoahaoism service: Not on file     Active member of club or organization: Not on file     Attends meetings of clubs or organizations: Not on file     Relationship status: Not on file    Intimate partner violence     Fear of current or ex partner: Not on file     Emotionally abused: Not on file     Physically abused: Not on file     Forced sexual activity: Not on file   Other Topics Concern    Not on file   Social History Narrative    Suicide in family    [de-identified], aunts, cousin    Other cousin 4 attempts    Sister made 3 attempts    Relatives committed to [facility]     38 years. With  39 years    Son 39 y/o Arty Gopi       Review of Systems   Constitutional: Negative for chills, fever and unexpected weight change. HENT: Negative for congestion, rhinorrhea and sore throat. Eyes: Negative for visual disturbance. Respiratory: Negative for shortness of breath. Cardiovascular: Negative for chest pain. Gastrointestinal: Negative for abdominal pain, blood in stool, constipation and diarrhea. Endocrine: Negative for polyuria. Genitourinary: Negative for dysuria and hematuria. Musculoskeletal: Positive for arthralgias. Skin: Negative for rash. Neurological: Negative for weakness, numbness and headaches. Psychiatric/Behavioral: Positive for dysphoric mood and sleep disturbance. The patient is nervous/anxious.         PHYSICAL EXAMINATION:    Patient-Reported Vitals 7/23/2020   Patient-Reported Weight 160 lb   Patient-Reported Height 5 4   Patient-Reported Temperature 98.4      Physical Exam  Constitutional:       Appearance: Normal appearance. She is not ill-appearing. HENT:      Head: Normocephalic and atraumatic. Eyes:      Extraocular Movements: Extraocular movements intact. Pulmonary:      Effort: Pulmonary effort is normal.   Neurological:      General: No focal deficit present. Mental Status: She is alert and oriented to person, place, and time. Mental status is at baseline. Psychiatric:         Attention and Perception: She is inattentive. Mood and Affect: Mood is anxious and depressed. Affect is tearful. Speech: Speech is rapid and pressured and tangential.         Behavior: Behavior normal.         Thought Content: Thought content is paranoid. Judgment: Judgment is impulsive. ASSESSMENT/PLAN:  Leroy Luna is 63 y/o female seen for virtual visit for anxiety, depression, insomnia, controlled medication, back/hip/gluteal pain follow up. Increase paxil. Follow with ortho/pmr/neurosurgery if pain in hips/buttocks/back persists. 1. Anxiety and depression  2. Positive depression screening  PHQ-9 Total Score: 21 (9/14/2020  3:36 PM)  Thoughts that you would be better off dead, or of hurting yourself in some way: 0 (9/14/2020  3:36 PM)    FARHAD 7 SCORE 9/14/2020 2/24/2020   FARHAD-7 Total Score 19 10     -increase paxil to 40 mg daily; PA in future if needed for 50 mg daily  - PARoxetine (PAXIL) 40 MG tablet; Take 1 tablet by mouth every morning  Dispense: 90 tablet; Refill: 1  - Positive Screen for Clinical Depression with a Documented Follow-up Plan     3. Psychophysiological insomnia  -sleep hygiene encouraged; Xanax PRN started by previous prescriber    4. Fibromyalgia  - PARoxetine (PAXIL) 40 MG tablet; Take 1 tablet by mouth every morning  Dispense: 90 tablet; Refill: 1    5. Pain of both hip joints  -gluteus medius tendinopathy; followed by PM&R and orhtopedics  -follow up as needed    6.  Chronically on benzodiazepine therapy  -Xanax 1 mg HS PRN  -CDA/UDS up to date 2/2020; 3 month follow p    Return in about 3 months (around 12/14/2020). \"THIS VISIT WAS COMPLETED VIRTUALLY USING DOXY. ME\"  Spent >25 minutes of face to face interaction with patient counseling on diagnoses and treatment plan    Ricardo Marie is a 62 y.o. female being evaluated by a Virtual Visit (video visit) encounter to address concerns as mentioned above. A caregiver was present when appropriate. Due to this being a TeleHealth encounter (During [facility]-12 public health emergency), evaluation of the following organ systems was limited: Vitals/Constitutional/EENT/Resp/CV/GI//MS/Neuro/Skin/Heme-Lymph-Imm. Pursuant to the emergency declaration under the [address] [place] [address], [address] [place] [address] authority and the [employer] and [employer] Act, this Virtual Visit was conducted with patient's (and/or legal guardian's) consent, to reduce the patient's risk of exposure to COVID-19 and provide necessary medical care. The patient (and/or legal guardian) has also been advised to contact this office for worsening conditions or problems, and seek emergency medical treatment and/or call 911 if deemed necessary. Patient identification was verified at the start of the visit: Yes    Total time spent on this encounter: >25 minutes    Services were provided through a video synchronous discussion virtually to substitute for in-person clinic visit. Patient and provider were located at their individual homes. --Mariela Ferris. Sridhar Meza., DO on 9/14/2020 at 10:03 PM    An electronic signature was used to authenticate this note.

## 2020-12-08 ENCOUNTER — TELEPHONE (OUTPATIENT)
Dept: PRIMARY CARE CLINIC | Age: 55
End: 2020-12-08

## 2020-12-08 RX ORDER — ALPRAZOLAM 1 MG/1
TABLET ORAL
Qty: 30 TABLET | Refills: 2 | Status: SHIPPED | OUTPATIENT
Start: 2020-12-08 | End: 2021-02-08

## 2020-12-08 NOTE — TELEPHONE ENCOUNTER
[phone]  Patient would like to change up coming visit to a vv she says she is worried about covid 19    Please advise

## 2020-12-14 ENCOUNTER — VIRTUAL VISIT (OUTPATIENT)
Dept: PRIMARY CARE CLINIC | Age: 55
End: 2020-12-14
Payer: COMMERCIAL

## 2020-12-14 PROBLEM — M54.50 LOW BACK PAIN: Status: ACTIVE | Noted: 2019-10-22

## 2020-12-14 PROCEDURE — 99214 OFFICE O/P EST MOD 30 MIN: CPT | Performed by: FAMILY MEDICINE

## 2020-12-14 RX ORDER — PAROXETINE HYDROCHLORIDE 40 MG/1
40 TABLET, FILM COATED ORAL EVERY MORNING
Qty: 90 TABLET | Refills: 1 | Status: CANCELLED | OUTPATIENT
Start: 2020-12-14

## 2020-12-14 RX ORDER — ALPRAZOLAM 1 MG/1
TABLET ORAL
Qty: 30 TABLET | Refills: 2 | Status: CANCELLED | OUTPATIENT
Start: 2020-12-14 | End: 2021-03-14

## 2020-12-14 RX ORDER — SUMATRIPTAN 100 MG/1
TABLET, FILM COATED ORAL
COMMUNITY

## 2020-12-14 ASSESSMENT — ENCOUNTER SYMPTOMS
NAUSEA: 0
SHORTNESS OF BREATH: 0
CONSTIPATION: 0
ABDOMINAL PAIN: 0
VOMITING: 0
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
## 2021-01-08 ENCOUNTER — TELEPHONE (OUTPATIENT)
Dept: PRIMARY CARE CLINIC | Age: 56
End: 2021-01-08

## 2021-01-08 NOTE — TELEPHONE ENCOUNTER
Left patient multiple messages returning her call for a brief voicemail. No details.   Please advise

## 2021-01-14 ENCOUNTER — TELEPHONE (OUTPATIENT)
Dept: PRIMARY CARE CLINIC | Age: 56
End: 2021-01-14

## 2021-01-14 NOTE — TELEPHONE ENCOUNTER
Patient is calling needing a refill of ALPRAZolam (XANAX) 1 MG tablet but would like  The medication to be upped to 2mg she is still not sleeping well    Please advise  [phone]

## 2021-01-14 NOTE — TELEPHONE ENCOUNTER
Was filled on 1/8/2021. Will increase at next fill but will not fill until 30 days from last fill. Reminder at next refill but will not address until next refill due  Please let pt know.

## 2021-01-18 ENCOUNTER — TELEPHONE (OUTPATIENT)
Dept: PRIMARY CARE CLINIC | Age: 56
End: 2021-01-18

## 2021-01-18 NOTE — TELEPHONE ENCOUNTER
Patient is calling wanting to have a hour appt with Dr Calvin Welsh. States she has way to much medically wrong to go over it in 30 minutes.   Is it okay to schedule patient for a hour visit      Please advise  Shannan Calle [phone] (home)

## 2021-01-19 NOTE — TELEPHONE ENCOUNTER
Lm informing pt of visits only being 30 mins and doctor stating that she can come 15 mins early to optimize her visit if that works for her.   Close encounter

## 2021-02-04 ENCOUNTER — TELEPHONE (OUTPATIENT)
Dept: PRIMARY CARE CLINIC | Age: 56
End: 2021-02-04

## 2021-02-04 NOTE — TELEPHONE ENCOUNTER
Patient calling wanting advise or to come in. Patient would like to have a covid anti body test ordered. Also had a reaction to the shingle shot says it cause body aches, and for 6 days after she could not walk and was is worried about getting the second one. And would like to know if she should get the covid shot because she had such a bad reaction to the shingles shot. Also believes her blood oxygen level is low due to breathing issues, believes it is from a doctor doing 26 injections on her back when she was only supposed to get 3 in a year and it caused her blood work to go all over the place. And thinks that her breathing issues are also from possibly having covid and has given her COPD. Also is having issues from a procedure on her hip and she is in a lot of pain for joint dieses. And she states that with everything else that has been going on that she laid in bed for 2 months and made her hip worse. If we need to see patient in office she is requesting a hour + appointment time.          Please advise  Gretchen Aus [phone]

## 2021-02-05 ENCOUNTER — OFFICE VISIT (OUTPATIENT)
Dept: PRIMARY CARE CLINIC | Age: 56
End: 2021-02-05
Payer: COMMERCIAL

## 2021-02-05 ENCOUNTER — HOSPITAL ENCOUNTER (OUTPATIENT)
Age: 56
Discharge: HOME | End: 2021-02-05
Payer: COMMERCIAL

## 2021-02-05 VITALS
DIASTOLIC BLOOD PRESSURE: 86 MMHG | WEIGHT: 165.4 LBS | OXYGEN SATURATION: 98 % | SYSTOLIC BLOOD PRESSURE: 128 MMHG | HEIGHT: 64 IN | TEMPERATURE: 97.8 F | HEART RATE: 96 BPM | BODY MASS INDEX: 28.24 KG/M2

## 2021-02-05 DIAGNOSIS — Z12.31 ENCOUNTER FOR SCREENING MAMMOGRAM FOR MALIGNANT NEOPLASM OF BREAST: ICD-10-CM

## 2021-02-05 DIAGNOSIS — Z02.89 MEDICATION MANAGEMENT CONTRACT SIGNED: ICD-10-CM

## 2021-02-05 DIAGNOSIS — Z01.84 COVID-19 VIRUS IGG ANTIBODY TEST RESULT UNKNOWN: ICD-10-CM

## 2021-02-05 DIAGNOSIS — R53.83 OTHER FATIGUE: ICD-10-CM

## 2021-02-05 DIAGNOSIS — Z13.1 DIABETES MELLITUS SCREENING: ICD-10-CM

## 2021-02-05 DIAGNOSIS — Z12.11 COLON CANCER SCREENING: ICD-10-CM

## 2021-02-05 DIAGNOSIS — Z13.220 LIPID SCREENING: ICD-10-CM

## 2021-02-05 DIAGNOSIS — M79.7 FIBROMYALGIA: ICD-10-CM

## 2021-02-05 DIAGNOSIS — R06.02 SOB (SHORTNESS OF BREATH): ICD-10-CM

## 2021-02-05 DIAGNOSIS — R68.3 FINGER CLUBBING: ICD-10-CM

## 2021-02-05 DIAGNOSIS — T50.Z95A ADVERSE EFFECT OF VACCINE, INITIAL ENCOUNTER: ICD-10-CM

## 2021-02-05 DIAGNOSIS — Z12.4 CERVICAL CANCER SCREENING: ICD-10-CM

## 2021-02-05 DIAGNOSIS — Z79.899 CHRONICALLY ON BENZODIAZEPINE THERAPY: ICD-10-CM

## 2021-02-05 DIAGNOSIS — F32.A ANXIETY AND DEPRESSION: Primary | ICD-10-CM

## 2021-02-05 DIAGNOSIS — F51.04 PSYCHOPHYSIOLOGICAL INSOMNIA: ICD-10-CM

## 2021-02-05 DIAGNOSIS — F41.9 ANXIETY AND DEPRESSION: Primary | ICD-10-CM

## 2021-02-05 LAB
A/G RATIO: 1.4 (ref 1.1–2.2)
ALBUMIN SERPL-MCNC: 4.6 G/DL (ref 3.4–5)
ALP BLD-CCNC: 83 U/L (ref 40–129)
ALT SERPL-CCNC: 19 U/L (ref 10–40)
ANION GAP SERPL CALCULATED.3IONS-SCNC: 12 MMOL/L (ref 3–16)
AST SERPL-CCNC: 20 U/L (ref 15–37)
BASOPHILS ABSOLUTE: 0.1 K/UL (ref 0–0.2)
BASOPHILS RELATIVE PERCENT: 1 %
BILIRUB SERPL-MCNC: 0.5 MG/DL (ref 0–1)
BUN BLDV-MCNC: 8 MG/DL (ref 7–20)
CALCIUM SERPL-MCNC: 10.1 MG/DL (ref 8.3–10.6)
CHLORIDE BLD-SCNC: 101 MMOL/L (ref 99–110)
CHOLESTEROL, TOTAL: 217 MG/DL (ref 0–199)
CO2: 26 MMOL/L (ref 21–32)
CREAT SERPL-MCNC: 0.8 MG/DL (ref 0.6–1.1)
EOSINOPHILS ABSOLUTE: 0.2 K/UL (ref 0–0.6)
EOSINOPHILS RELATIVE PERCENT: 3.1 %
GFR AFRICAN AMERICAN: >60
GFR NON-AFRICAN AMERICAN: >60
GLOBULIN: 3.2 G/DL
GLUCOSE BLD-MCNC: 104 MG/DL (ref 70–99)
HCT VFR BLD CALC: 43.4 % (ref 36–48)
HDLC SERPL-MCNC: 68 MG/DL (ref 40–60)
HEMOGLOBIN: 14.2 G/DL (ref 12–16)
LDL CHOLESTEROL CALCULATED: 113 MG/DL
LYMPHOCYTES ABSOLUTE: 1.6 K/UL (ref 1–5.1)
LYMPHOCYTES RELATIVE PERCENT: 25.8 %
MCH RBC QN AUTO: 30.1 PG (ref 26–34)
MCHC RBC AUTO-ENTMCNC: 32.7 G/DL (ref 31–36)
MCV RBC AUTO: 92.1 FL (ref 80–100)
MONOCYTES ABSOLUTE: 0.6 K/UL (ref 0–1.3)
MONOCYTES RELATIVE PERCENT: 9.5 %
NEUTROPHILS ABSOLUTE: 3.7 K/UL (ref 1.7–7.7)
NEUTROPHILS RELATIVE PERCENT: 60.6 %
PDW BLD-RTO: 13.5 % (ref 12.4–15.4)
PLATELET # BLD: 347 K/UL (ref 135–450)
PMV BLD AUTO: 8.3 FL (ref 5–10.5)
POTASSIUM SERPL-SCNC: 5 MMOL/L (ref 3.5–5.1)
RBC # BLD: 4.71 M/UL (ref 4–5.2)
SARS-COV-2 ANTIBODY, TOTAL: NEGATIVE
SODIUM BLD-SCNC: 139 MMOL/L (ref 136–145)
TOTAL PROTEIN: 7.8 G/DL (ref 6.4–8.2)
TRIGL SERPL-MCNC: 180 MG/DL (ref 0–150)
VLDLC SERPL CALC-MCNC: 36 MG/DL
WBC # BLD: 6 K/UL (ref 4–11)

## 2021-02-05 PROCEDURE — 83036 HEMOGLOBIN GLYCOSYLATED A1C: CPT

## 2021-02-05 PROCEDURE — 36415 COLL VENOUS BLD VENIPUNCTURE: CPT

## 2021-02-05 PROCEDURE — 99214 OFFICE O/P EST MOD 30 MIN: CPT | Performed by: FAMILY MEDICINE

## 2021-02-05 PROCEDURE — 86769 SARS-COV-2 COVID-19 ANTIBODY: CPT

## 2021-02-05 PROCEDURE — 80061 LIPID PANEL: CPT

## 2021-02-05 PROCEDURE — 85025 COMPLETE CBC W/AUTO DIFF WBC: CPT

## 2021-02-05 PROCEDURE — 80053 COMPREHEN METABOLIC PANEL: CPT

## 2021-02-05 PROCEDURE — 80307 DRUG TEST PRSMV CHEM ANLYZR: CPT

## 2021-02-05 ASSESSMENT — ENCOUNTER SYMPTOMS
SHORTNESS OF BREATH: 0
NAUSEA: 0
DIARRHEA: 0
ABDOMINAL PAIN: 0
CONSTIPATION: 0
RHINORRHEA: 0
BACK PAIN: 1
VOMITING: 0
SORE THROAT: 0

## 2021-02-05 NOTE — PATIENT INSTRUCTIONS
-Get blood work today including covid antibody test     -Get urine test today and urine drug screen today    -Sign controlled drug agreement today    -See your gyencologist at [facility] and send us the pap smear results [phone]    -Schedule/do 6 minute walk test and spirometry for breathing concerns    -Continue to see Dr Karine Gallegos for pain in your back/buttocks/hip    Follow up with me in 6 months

## 2021-02-05 NOTE — LETTER
CONTROLLED SUBSTANCE MEDICATION AGREEMENT     Patient Name: Alexis Mcneil  Patient YOB: 1965   I understand, that controlled substance medications may be used to help better manage my symptoms and to improve my ability to function at home, work and in social settings. However, I also understand that these medications do have risks, which have been discussed with me, including possible development of physical or psychological dependence. I understand that successful treatment requires mutual trust and honesty between me and my provider. I understand and agree that following this Medication Agreement is necessary in continuing my provider-patient relationship and the success of my treatment plan. Explanation from my Provider: Benefits and Goals of Controlled Substance Medications: There are two potential goals for your treatment: (1) decreased pain and suffering (2) improved daily life functions. There are many possible treatments for your chronic condition(s). Alternatives such as physical therapy, yoga, massage, home daily exercise, meditation, relaxation techniques, injections, chiropractic manipulations, surgery, cognitive therapy, hypnosis and many medications that are not habit-forming may be used. Use of controlled substance medications may be helpful, but they are unlikely to resolve all symptoms or restore all function. Explanation from my Provider: Risks of Controlled Substance Medications:  Opioid pain medications: These medications can lead to problems such as addiction/dependence, sedation, lightheadedness/dizziness, memory issues, falls, constipation, nausea, or vomiting. They may also impair the ability to drive or operate machinery. Additionally, these medications may lower testosterone levels, leading to loss of bone strength, stamina and sex drive.   They may cause problems with breathing, sleep apnea and reduced coughing, which is especially dangerous for patients with lung disease. Overdose or dangerous interactions with alcohol and other medications may occur, leading to death. Hyperalgesia may develop, which means patients receiving opioids for the treatment of pain may become more sensitive to certain painful stimuli, and in some cases, experience pain from ordinarily non-painful stimuli. Women between the ages of 14-53 who could become pregnant should carefully weigh the risks and benefits of opioids with their physicians, as these medications increase the risk of pregnancy complications, including miscarriage,  delivery and stillbirth. It is also possible for babies to be born addicted to opioids. Opioid dependence withdrawal symptoms may include; feelings of uneasiness, increased pain, irritability, belly pain, diarrhea, sweats and goose-flesh. Benzodiazepines and non-benzodiazepine sleep medications: These medications can lead to problems such as addiction/dependence, sedation, fatigue, lightheadedness, dizziness, incoordination, falls, depression, hallucinations, and impaired judgment, memory and concentration. The ability to drive and operate machinery may also be affected. Abnormal sleep-related behaviors have been reported, including sleepwalking, driving, making telephone calls, eating, or having sex while not fully awake. These medications can suppress breathing and worsen sleep apnea, particularly when combined with alcohol or other sedating medications, potentially leading to death. Dependence withdrawal symptoms may include tremors, anxiety, hallucinations and seizures. Stimulants:  Common adverse effects include addiction/dependence, increased blood  pressure and heart rate, decreased appetite, nausea, involuntary weight loss, insomnia,                                                                                                                     Initials:_______   irritability, and headaches.   These risks may increase when these medications are combined with other stimulants, such as caffeine pills or energy drinks, certain weight loss supplements and oral decongestants. Dependence withdrawal symptoms may include depressed mood, loss of interest, suicidal thoughts, anxiety, fatigue, appetite changes and agitation. Testosterone replacement therapy:  Potential side effects include increased risk of stroke and heart attack, blood clots, increased blood pressure, increased cholesterol, enlarged prostate, sleep apnea, irritability/aggression and other mood disorders, and decreased fertility. I agree and understand that I and my prescriber have the following rights and responsibilities regarding my treatment plan:     1. MY RIGHTS:  To be informed of my treatment and medication plan. To be an active participant in my health and wellbeing. 2. MY RESPONSIBILITY AND UNDERSTANDING FOR USE OF MEDICATIONS   I will take medications at the dose and frequency as directed. For my safety, I will not increase or change how I take my medications without the recommendation of my healthcare provider.  I will actively participate in any program recommended by my provider which may improve function, including social, physical, psychological programs.  I will not take my medications with alcohol or other drugs not prescribed to me. I understand that drinking alcohol with my medications increases the chances of side effects, including reduced breathing rate and could lead to personal injury when operating machinery.  I understand that if I have a history of substance use disorders, including alcohol or other illicit drugs, that I may be at increased risk of addiction to my medications.  I agree to notify my provider immediately if I should become pregnant so that my treatment plan can be adjusted.    I agree and understand that I shall only receive controlled substance medications from the prescriber that signed this agreement unless there is [email or web address]    5. MY RESPONSIBILITY WITH ILLEGAL DRUGS    I will not use illegal or street drugs or another person's prescription medications not prescribed to me.  If there are identified addiction type symptoms, then referral to a program may be provided by my provider and I agree to follow through with this recommendation. 6. MY RESPONSIBILITY FOR COOPERATION WITH INVESTIGATIONS   I understand that my provider will comply with any applicable law and may discuss my use and/or possible misuse/abuse of controlled substances and alcohol, as appropriate, with any health care provider involved in my care, pharmacist, or legal authority.  I authorize my provider and pharmacy to cooperate fully with law enforcement agencies (as permitted by law) in the investigation of any possible misuse, sale, or other diversion of my controlled substances.  I agree to waive any applicable privilege or right of privacy or confidentiality with respect to these authorizations. 7. PROVIDERS RIGHT TO MONITOR FOR SAFETY: PRESCRIPTION MONITORING / DRUG TESTING   I consent to drug/toxicology screening and will submit to a drug screen upon my providers request to assure I am only taking the prescribed drugs for my safety monitoring. I understand that a drug screen is a laboratory test in which a sample of my urine, blood or saliva is checked to see what drugs I have been taking. This may entail an observed urine specimen, which means that a nurse or other health care provider may watch me provide urine, and I will cooperate if I am asked to provide an observed specimen.  I understand that my provider will check a copy of my State Prescription Monitoring Program () Report in order to safely prescribe medications.  Pill Counts: I consent to pill counts when requested.   I may be asked to bring all my prescribed controlled substance medications, in their original bottles, to all of my scheduled appointments. In addition, my provider may ask me to come to the practice at any time for a random pill count. 8. TERMINATION OF THIS AGREEMENT  For my safety, my prescriber has the right to stop prescribing controlled substance medications and may end this agreement. Initials:_______   Conditions that may result in termination of this agreement:  a. I do not show any improvement in pain, or my activity has not improved. b. I develop rapid tolerance or loss of improvement, as described in my treatment plan.  c. I develop significant side effects from the medication. d. My behavior is not consistent with the responsibilities outlined above, thereby causing safety concerns to continue prescribing controlled substance medications. e. I fail to follow the terms of this agreement. f. Other:____________________________       UNDERSTANDING THIS MEDICATION AGREEMENT:    I have read the above and have had all my questions answered. For chronic disease management, I know that my symptoms can be managed with many types of treatments. A chronic medication trial may be part of my treatment, but I must be an active participant in my care. Medication therapy is only one part of my symptom management plan. In some cases, there may be limited scientific evidence to support the chronic use of certain medications to improve symptoms and daily function. Furthermore, in certain circumstances, there may be scientific information that suggests that the use of chronic controlled substances may worsen my symptoms and increase my risk of unintentional death directly related to this medication therapy. I know that if my provider feels my risk from controlled medications is greater than my benefit, I will have my controlled substance medication(s) compassionately lowered or removed altogether.      I further agree to allow this office to contact my HIPAA contact if there are concerns about my safety and use of the controlled medications. I have agreed to use the prescribed controlled substance medications to me as instructed by my provider and as stated in this Medication Agreement. My initial on each page and my signature below shows that I have read each page and I have had the opportunity to ask questions with answers provided by my provider.     Patient Name (Printed): _____________________________________  Patient Signature:  ______________________   Date: _____________    Prescriber Name (Printed): ___________________________________  Prescriber Signature: _____________________  Date: _____________

## 2021-02-05 NOTE — PROGRESS NOTES
Natural Products (T-RELIEF CBD+13 SL)       ALPRAZolam (XANAX) 1 MG tablet TAKE 1 TABLET BY MOUTH EVERY NIGHT AS NEEDED FOR SLEEP OR ANXIETY 30 tablet 2    PARoxetine (PAXIL) 40 MG tablet Take 1 tablet by mouth every morning 90 tablet 1    guaiFENesin (MUCINEX) 600 MG extended release tablet 1-2 tabs every 12 hours as needed for congestion. Increase water intake with this medication. 30 tablet 0    fluticasone (FLONASE) 50 MCG/ACT nasal spray 2 sprays by Nasal route      SUMAtriptan (IMITREX) 100 MG tablet Take 1 tab at onset of migraine. Can take second tab 2 hours later if needed. No current facility-administered medications for this visit. Allergies   Allergen Reactions    Latex     Codeine Anaphylaxis    Lidocaine     Meperidine Anaphylaxis    Diphenhydramine Other (See Comments)     May be associated with pancreatitis attacks in this patient.   Usually with higher doses    Iodinated Diagnostic Agents Nausea Only and Other (See Comments)     Bee stings, body aches      Prednisone     Trazodone Other (See Comments)     pancreatitis     Family History   Problem Relation Age of Onset    Depression Mother     Anxiety Disorder Mother     Alzheimer's Disease Mother     Seizures Mother     Alzheimer's Disease Father     Heart Failure Father     Depression Father     Anxiety Disorder Father      Social History     Socioeconomic History    Marital status:      Spouse name: Deandra Emerson    Number of children: 1    Years of education: 15    Highest education level: 11th grade   Occupational History    Not on file   Social Needs    Financial resource strain: Not hard at all   Juan M-Brooke insecurity     Worry: Never true     Inability: Never true    Transportation needs     Medical: No     Non-medical: No   Tobacco Use    Smoking status: Former Smoker     Packs/day: 1.00     Years: 30.00     Pack years: 30.00     Types: Cigarettes     Start date: 1/1/1974     Quit date: 1/1/2004 Years since quittin.1    Smokeless tobacco: Never Used   Substance and Sexual Activity    Alcohol use: Not Currently    Drug use: Not Currently    Sexual activity: Yes     Partners: Male     Birth control/protection: Surgical   Lifestyle    Physical activity     Days per week: 0 days     Minutes per session: 0 min    Stress: Very much   Relationships    Social connections     Talks on phone: Not on file     Gets together: Not on file     Attends Mandaen service: Not on file     Active member of club or organization: Not on file     Attends meetings of clubs or organizations: Not on file     Relationship status: Not on file    Intimate partner violence     Fear of current or ex partner: Not on file     Emotionally abused: Not on file     Physically abused: Not on file     Forced sexual activity: Not on file   Other Topics Concern    Not on file   Social History Narrative    Suicide in family    [de-identified], aunts, cousin    Other cousin 4 attempts    Sister made 3 attempts    Relatives committed to [facility]     38 years. With  39 years    Son 39 y/o Brendan Marks     Review of Systems   Constitutional: Negative for appetite change, chills and fever. HENT: Negative for rhinorrhea and sore throat. Eyes: Negative for visual disturbance. Respiratory: Negative for shortness of breath. Cardiovascular: Negative for chest pain. Gastrointestinal: Negative for abdominal pain, constipation, diarrhea, nausea and vomiting. Genitourinary: Negative for dysuria and hematuria. Musculoskeletal: Positive for arthralgias, back pain and neck pain. Skin: Negative for rash. Neurological: Positive for headaches. Negative for dizziness and numbness. Psychiatric/Behavioral: Positive for dysphoric mood and sleep disturbance. The patient is nervous/anxious.       Vitals:    21 1008   BP: 128/86   Pulse: 96   Temp: 97.8 °F (36.6 °C)   TempSrc: Temporal   SpO2: 98%   Weight: 165 lb 6.4 oz (75 kg) Height: 5' 3.5\" (1.613 m)       Physical Exam  Constitutional:       General: She is not in acute distress. Appearance: She is well-developed. HENT:      Head: Normocephalic and atraumatic. Right Ear: Tympanic membrane normal.      Left Ear: Tympanic membrane normal.      Nose: Nose normal. No rhinorrhea. Mouth/Throat:      Pharynx: Uvula midline. Eyes:      Pupils: Pupils are equal, round, and reactive to light. Neck:      Trachea: No tracheal deviation. Cardiovascular:      Rate and Rhythm: Normal rate and regular rhythm. Heart sounds: Normal heart sounds. No murmur. No friction rub. No gallop. Pulmonary:      Effort: Pulmonary effort is normal. No respiratory distress. Breath sounds: Normal breath sounds. No wheezing or rales. Abdominal:      General: Bowel sounds are normal. There is no distension. Palpations: Abdomen is soft. Tenderness: There is no abdominal tenderness. There is no rebound. Musculoskeletal: Normal range of motion. General: No tenderness. Lymphadenopathy:      Cervical: No cervical adenopathy. Skin:     General: Skin is warm and dry. Findings: No erythema or rash. Neurological:      Mental Status: She is alert and oriented to person, place, and time. Cranial Nerves: No cranial nerve deficit. Psychiatric:         Attention and Perception: She is inattentive. Mood and Affect: Mood is anxious and depressed. Speech: Speech is rapid and pressured and tangential.         Behavior: Behavior is hyperactive. Thought Content: Thought content does not include homicidal or suicidal ideation.          Assessment and Plan:  Vilma Lock is a 61 y/o female who was seen today for depression, anxiety, insomnia, back pain, neck pain, chronic pain, shortness of breath, and immunization questions    SOB (shortness of breath)  Other fatigue  -     6 Minute Walk Test; Future  -     Spirometry Before / After Exercise; Future    COVID-19 virus IgG antibody test result unknown  -     Covid-19, Antibody, Total; Future    Anxiety and depression  Psychophysiological insomnia  Fibromyalgia  Chronically on benzodiazepine therapy  Medication management contract signed  -     Drug Panel-PM-HI Res-UR Interp-A; Future  -sign CDA/do UDS for alprazolam RX  -Paxil 40 mg daily    Diabetes mellitus screening  -     HEMOGLOBIN A1C; Future  -     Comprehensive Metabolic Panel; Future    Lipid screening  -     Lipid Panel; Future    Finger clubbing  -     CBC Auto Differential; Future    Adverse effect of vaccine, initial encounter  Pt states she could not walk after shingles vaccine for 6 days  Consider waiting on 2nd dose  Unclear if had paralysis or other etiology from vaccine    Colon cancer screening  -     Cologuard (For External Results Only); Future    Encounter for screening mammogram for malignant neoplasm of breast  -     MAMMOGRAM POST BX CLIP PLACEMENT BILATERAL; Future    Cervical cancer screening  -return for PAP smear in future    Return in about 6 months (around 8/5/2021). Spent 35 minutes of face to face interaction with patient counseling on diagnoses and treatment plan      Nick Pereira.  Terra Hernandez.  2/5/2021

## 2021-02-06 LAB
ESTIMATED AVERAGE GLUCOSE: 116.9 MG/DL
HBA1C MFR BLD: 5.7 %

## 2021-02-08 ENCOUNTER — TELEPHONE (OUTPATIENT)
Dept: PRIMARY CARE CLINIC | Age: 56
End: 2021-02-08

## 2021-02-08 DIAGNOSIS — F41.9 ANXIETY AND DEPRESSION: Primary | ICD-10-CM

## 2021-02-08 DIAGNOSIS — F32.A ANXIETY AND DEPRESSION: Primary | ICD-10-CM

## 2021-02-08 DIAGNOSIS — F51.04 PSYCHOPHYSIOLOGICAL INSOMNIA: ICD-10-CM

## 2021-02-08 LAB
6-ACETYLMORPHINE: NOT DETECTED
7-AMINOCLONAZEPAM: NOT DETECTED
ALPHA-OH-ALPRAZOLAM: PRESENT
ALPRAZOLAM: PRESENT
AMPHETAMINE: NOT DETECTED
BARBITURATES: NOT DETECTED
BENZOYLECGONINE: NOT DETECTED
BUPRENORPHINE: NOT DETECTED
CARISOPRODOL: NOT DETECTED
CLONAZEPAM: NOT DETECTED
CODEINE: NOT DETECTED
CREATININE URINE: 100.2 MG/DL (ref 20–400)
DIAZEPAM: NOT DETECTED
DRUGS EXPECTED: NORMAL
EER PAIN MGT DRUG PANEL, HIGH RES/EMIT U: NORMAL
ETHYL GLUCURONIDE: NOT DETECTED
FENTANYL: NOT DETECTED
HYDROCODONE: NOT DETECTED
HYDROMORPHONE: NOT DETECTED
LORAZEPAM: NOT DETECTED
MARIJUANA METABOLITE: NOT DETECTED
MDA: NOT DETECTED
MDEA: NOT DETECTED
MDMA URINE: NOT DETECTED
MEPERIDINE: NOT DETECTED
METHADONE: NOT DETECTED
METHAMPHETAMINE: NOT DETECTED
METHYLPHENIDATE: NOT DETECTED
MIDAZOLAM: NOT DETECTED
MORPHINE: NOT DETECTED
NORBUPRENORPHINE, FREE: NOT DETECTED
NORDIAZEPAM: NOT DETECTED
NORFENTANYL: NOT DETECTED
NORHYDROCODONE, URINE: NOT DETECTED
NOROXYCODONE: NOT DETECTED
NOROXYMORPHONE, URINE: NOT DETECTED
OXAZEPAM: NOT DETECTED
OXYCODONE: NOT DETECTED
OXYMORPHONE: NOT DETECTED
PAIN MANAGEMENT DRUG PANEL: NORMAL
PAIN MANAGEMENT DRUG PANEL: NORMAL
PCP: NOT DETECTED
PHENTERMINE: NOT DETECTED
PROPOXYPHENE: NOT DETECTED
TAPENTADOL, URINE: NOT DETECTED
TAPENTADOL-O-SULFATE, URINE: NOT DETECTED
TEMAZEPAM: NOT DETECTED
TRAMADOL: NOT DETECTED
ZOLPIDEM: NOT DETECTED

## 2021-02-08 RX ORDER — ALPRAZOLAM 2 MG/1
1-2 TABLET ORAL NIGHTLY PRN
Qty: 30 TABLET | Refills: 0 | Status: SHIPPED | OUTPATIENT
Start: 2021-02-08 | End: 2021-03-14 | Stop reason: SDUPTHER

## 2021-02-08 NOTE — TELEPHONE ENCOUNTER
Patient is calling needing a new rx for her Xanax needing it upped to 2mg.  Please advise  Ida Reid [phone]

## 2021-03-11 DIAGNOSIS — Z13.31 POSITIVE DEPRESSION SCREENING: ICD-10-CM

## 2021-03-11 DIAGNOSIS — F32.A ANXIETY AND DEPRESSION: ICD-10-CM

## 2021-03-11 DIAGNOSIS — F41.9 ANXIETY AND DEPRESSION: ICD-10-CM

## 2021-03-11 DIAGNOSIS — F51.04 PSYCHOPHYSIOLOGICAL INSOMNIA: ICD-10-CM

## 2021-03-11 DIAGNOSIS — M79.7 FIBROMYALGIA: ICD-10-CM

## 2021-03-11 RX ORDER — PAROXETINE HYDROCHLORIDE 40 MG/1
40 TABLET, FILM COATED ORAL EVERY MORNING
Qty: 90 TABLET | Refills: 1 | Status: SHIPPED | OUTPATIENT
Start: 2021-03-11 | End: 2021-04-15 | Stop reason: SDUPTHER

## 2021-03-12 DIAGNOSIS — F41.9 ANXIETY AND DEPRESSION: ICD-10-CM

## 2021-03-12 DIAGNOSIS — F51.04 PSYCHOPHYSIOLOGICAL INSOMNIA: ICD-10-CM

## 2021-03-12 DIAGNOSIS — F32.A ANXIETY AND DEPRESSION: ICD-10-CM

## 2021-03-14 RX ORDER — ALPRAZOLAM 2 MG/1
1-2 TABLET ORAL NIGHTLY PRN
Qty: 30 TABLET | Refills: 0 | Status: SHIPPED | OUTPATIENT
Start: 2021-03-14 | End: 2021-04-15 | Stop reason: SDUPTHER

## 2021-03-15 NOTE — TELEPHONE ENCOUNTER
Refilled. PDMP Monitoring:    Last PDMP Bella Merrill as Reviewed [facility]):  Review User Review Instant Review Result   Kyaw Parra. 3/14/2021 10:03 PM Reviewed PDMP [1]     [unfilled]  Urine Drug Screenings (1 yr)     Drug Panel-PM-HI Res-UR Interp-A  Collected: 2/5/2021 11:15 AM (Final result)    Narrative: Referred out by:  [facility]) - [address],  [place], [address] [facility] [place]   Phone [phone]    Complete Results          DRUG SCREEN MULTI URINE  Collected: 2/24/2020  2:50 PM (Final result)    Narrative: Performed at:  [place] [address]   Phone [phone]    Complete Results              Medication Contract and Consent for Opioid Use Documents Filed     Patient Documents       Type of Document Status Date Received Received By Description     Medication Contract Received 2/25/2020 10:31 AM ДМИТРИЙ HAWKINS Medication Contract     Medication Contract Received 2/8/2021  1:25 PM ESTRELLA JARA Controlled Substance Medication Agreement              1. Anxiety and depression  2. Psychophysiological insomnia  - ALPRAZolam (XANAX) 2 MG tablet; Take 0.5-1 tablets by mouth nightly as needed for Sleep for up to 30 days. Dispense: 30 tablet; Refill: 0    Needs follow up q 90 days    Som Dyer.  3/14/2021

## 2021-04-06 ENCOUNTER — TELEPHONE (OUTPATIENT)
Dept: PRIMARY CARE CLINIC | Age: 56
End: 2021-04-06

## 2021-04-06 NOTE — TELEPHONE ENCOUNTER
Patient is calling wanting us to know that she has not gotten her pap or mammogram and will get them when COVID is over. As well as her stress test she can not do it because it is in a hospital setting and she does not want COVID. She would like to know if there is something else she can do in place of those tests.       Barrera Nicholson [phone]

## 2021-04-07 NOTE — TELEPHONE ENCOUNTER
It is safe for patient to have all 3 tests done at office or hospital. She will not get covid from having them done. If she does not feel safe she can defer all 3 tests at her own discretion/risk. All are still recommended     Zaki Cardenas.  Andrea Castano. 4/7/2021

## 2021-04-15 ENCOUNTER — TELEPHONE (OUTPATIENT)
Dept: PRIMARY CARE CLINIC | Age: 56
End: 2021-04-15

## 2021-04-15 DIAGNOSIS — F41.9 ANXIETY AND DEPRESSION: ICD-10-CM

## 2021-04-15 DIAGNOSIS — F32.A ANXIETY AND DEPRESSION: ICD-10-CM

## 2021-04-15 DIAGNOSIS — Z13.31 POSITIVE DEPRESSION SCREENING: ICD-10-CM

## 2021-04-15 DIAGNOSIS — M79.7 FIBROMYALGIA: ICD-10-CM

## 2021-04-15 DIAGNOSIS — F51.04 PSYCHOPHYSIOLOGICAL INSOMNIA: ICD-10-CM

## 2021-04-15 RX ORDER — PAROXETINE HYDROCHLORIDE 40 MG/1
40 TABLET, FILM COATED ORAL EVERY MORNING
Qty: 90 TABLET | Refills: 1 | Status: SHIPPED | OUTPATIENT
Start: 2021-04-15

## 2021-04-15 RX ORDER — ALPRAZOLAM 2 MG/1
1-2 TABLET ORAL NIGHTLY PRN
Qty: 30 TABLET | Refills: 0 | Status: SHIPPED | OUTPATIENT
Start: 2021-04-15 | End: 2021-05-12

## 2021-04-15 NOTE — TELEPHONE ENCOUNTER
Patient is calling needing a refill of   ALPRAZolam (XANAX) 2 MG tablet  PARoxetine (PAXIL) 40 MG tablet  Please advise  Flaco Laird [phone] (home)

## 2021-05-12 DIAGNOSIS — F51.04 PSYCHOPHYSIOLOGICAL INSOMNIA: ICD-10-CM

## 2021-05-12 DIAGNOSIS — F41.9 ANXIETY AND DEPRESSION: ICD-10-CM

## 2021-05-12 DIAGNOSIS — F32.A ANXIETY AND DEPRESSION: ICD-10-CM

## 2021-05-12 RX ORDER — ALPRAZOLAM 2 MG/1
TABLET ORAL
Qty: 30 TABLET | Refills: 0 | Status: SHIPPED | OUTPATIENT
Start: 2021-05-12 | End: 2021-06-14 | Stop reason: SDUPTHER

## 2021-05-13 ENCOUNTER — TELEPHONE (OUTPATIENT)
Dept: WOMENS IMAGING | Age: 56
End: 2021-05-13

## 2021-05-13 NOTE — TELEPHONE ENCOUNTER
Called and spoke with patient in regards to her screening mammogram--patient knows she is overdue but is dealing with other health issues at this time and does not want to schedule. Pt will call when she is able to, does not want anyone else calling her to schedule appointments.

## 2021-06-14 DIAGNOSIS — F51.04 PSYCHOPHYSIOLOGICAL INSOMNIA: ICD-10-CM

## 2021-06-14 DIAGNOSIS — F41.9 ANXIETY AND DEPRESSION: ICD-10-CM

## 2021-06-14 DIAGNOSIS — F32.A ANXIETY AND DEPRESSION: ICD-10-CM

## 2021-06-14 RX ORDER — ALPRAZOLAM 2 MG/1
TABLET ORAL
Qty: 30 TABLET | Refills: 0 | Status: SHIPPED | OUTPATIENT
Start: 2021-06-14 | End: 2021-07-14

## 2021-06-22 ENCOUNTER — OFFICE VISIT (OUTPATIENT)
Dept: PRIMARY CARE CLINIC | Age: 56
End: 2021-06-22
Payer: COMMERCIAL

## 2021-06-22 VITALS
HEART RATE: 98 BPM | OXYGEN SATURATION: 97 % | HEIGHT: 64 IN | SYSTOLIC BLOOD PRESSURE: 132 MMHG | BODY MASS INDEX: 29.74 KG/M2 | DIASTOLIC BLOOD PRESSURE: 78 MMHG | TEMPERATURE: 97.8 F | WEIGHT: 174.2 LBS

## 2021-06-22 DIAGNOSIS — M51.36 DEGENERATIVE DISC DISEASE, LUMBAR: Primary | ICD-10-CM

## 2021-06-22 DIAGNOSIS — F32.A ANXIETY AND DEPRESSION: ICD-10-CM

## 2021-06-22 DIAGNOSIS — F41.9 ANXIETY AND DEPRESSION: ICD-10-CM

## 2021-06-22 DIAGNOSIS — I25.10 CORONARY ARTERY DISEASE INVOLVING NATIVE CORONARY ARTERY OF NATIVE HEART WITHOUT ANGINA PECTORIS: ICD-10-CM

## 2021-06-22 PROCEDURE — 99214 OFFICE O/P EST MOD 30 MIN: CPT | Performed by: FAMILY MEDICINE

## 2021-06-22 RX ORDER — ASPIRIN 81 MG/1
81 TABLET ORAL DAILY
Qty: 30 TABLET | Refills: 0 | Status: SHIPPED | OUTPATIENT
Start: 2021-06-22 | End: 2021-07-13

## 2021-06-22 RX ORDER — ATORVASTATIN CALCIUM 80 MG/1
80 TABLET, FILM COATED ORAL DAILY
Qty: 30 TABLET | Refills: 5 | Status: SHIPPED | OUTPATIENT
Start: 2021-06-22 | End: 2021-07-13

## 2021-06-22 ASSESSMENT — ENCOUNTER SYMPTOMS
NAUSEA: 0
COUGH: 0
SHORTNESS OF BREATH: 0
VOMITING: 0

## 2021-06-22 NOTE — PATIENT INSTRUCTIONS
Patient Education        Learning About Coronary Artery Disease (CAD)  What is coronary artery disease? Coronary artery disease is a condition that occurs when plaque builds up in the arteries that bring oxygen-rich blood to your heart. Plaque is a fatty substance made of cholesterol, calcium, and other substances in the blood. This process is called hardening of the arteries, or atherosclerosis. What happens when you have coronary artery disease? · Plaque may narrow the coronary arteries. Narrowed arteries cause poor blood flow. This can lead to angina symptoms such as chest pain or discomfort. If blood flow is completely blocked, you could have a heart attack. · You can slow and reduce the risk of future problems by making changes in your lifestyle. These include quitting smoking and eating heart-healthy foods. · Treatment, along with changes in your lifestyle, can help you live a longer and healthier life. How can you prevent coronary artery disease? · Do not smoke. It may be the best thing you can do to prevent coronary artery disease. If you need help quitting, talk to your doctor about stop-smoking programs and medicines. These can increase your chances of quitting for good. · Be active. Try to do moderate activity at least 2½ hours a week. Or try to do vigorous activity at least 1¼ hours a week. You may want to walk or try other activities, such as running, swimming, cycling, or playing tennis or team sports. · Eat heart-healthy foods. Eat more fruits and vegetables and less food that contains saturated and trans fats. Limit alcohol, sodium, and sweets. · Stay at a healthy weight. Lose weight if you need to. · Manage other health problems such as diabetes, high blood pressure, and high cholesterol. How is coronary artery disease treated? · Your doctor will suggest that you make lifestyle changes.  For example, your doctor may ask you to eat healthy foods, quit smoking, lose extra weight, and be more active. · You will take medicines that help prevent a heart attack. · Your doctor may suggest a procedure to open narrowed or blocked arteries. This is called angioplasty. Or your doctor may suggest using healthy blood vessels to create detours around narrowed or blocked arteries. This is called bypass surgery. Follow-up care is a key part of your treatment and safety. Be sure to make and go to all appointments, and call your doctor if you are having problems. It's also a good idea to know your test results and keep a list of the medicines you take. Where can you learn more? Go to https://[employer] org and sign in to your [employer] account. Enter [phone] in the [place] box to learn more about \"Learning About Coronary Artery Disease (CAD). \"     If you do not have an account, please click on the \"Sign Up Now\" link. Current as of: August 31, 2020               Content Version: 12.9  © 2006-2021 [employer]wise, Inc[employer]. Care instructions adapted under license by [facility] ([facility]). If you have questions about a medical condition or this instruction, always ask your healthcare professional. [place] [address] any warranty or liability for your use of this information.

## 2021-06-22 NOTE — PROGRESS NOTES
hyperlipidemia, medical noncompliance who presents today for the following. Pt presents today as an acute add-on stating that she would like to discuss x-rays.    (Of note patient is tangential and difficult to follow history)    States that she is following up with pain management and has also seen [facility] with Dr. Bianka Edmonds for chronic low back pain, she brings in x-rays from [facility] completed on 3/9/2021 which showed L-spine severe spondylolysis of L4-L5 which is stable, with multilevel facet arthropathy L5-S1 without acute fracture, hip revealed mild bilateral hip OA. Patient shows me this information from her phone, x-ray also noted CAD. Patient states that she would like to know what is next in her treatment plan. Patient does admit to following up with multiple pain management physicians as well as orthopedic surgeons and neurosurgeons. Declines any further acute complaints or concerns today. Patient Active Problem List   Diagnosis    Pain of both hip joints    Lumbar radiculopathy    Post traumatic stress disorder    Insomnia    Anxiety and depression    Degenerative disc disease, lumbar    IBS (irritable bowel syndrome)    Fibromyalgia    Migraines    Chronically on benzodiazepine therapy    Low back pain    Coronary artery disease involving native coronary artery without angina pectoris         Past Medical History:    Past Medical History:   Diagnosis Date    Anxiety and depression     Degenerative disc disease, lumbar     IBS (irritable bowel syndrome)     Insomnia     Lumbar disc disease     Migraines     Post traumatic stress disorder        Past Surgical History:  Past Surgical History:   Procedure Laterality Date    HEEL SPUR SURGERY      LUMBAR DISCECTOMY      TOENAIL EXCISION      TONSILLECTOMY      TUBAL LIGATION         Home Meds:  Prior to Visit Medications    Medication Sig Taking?  Authorizing Provider   atorvastatin (LIPITOR) 80 MG tablet Take 1 tablet by mouth daily Yes Charlotte Albert MD   aspirin EC 81 MG EC tablet Take 1 tablet by mouth daily Yes Charlotte Albert MD   ALPRAZolam (XANAX) 2 MG tablet TAKE 1/2 TO 1 TABLET BY MOUTH EVERY NIGHT AS NEEDED FOR SLEEP Yes Sharda ABRAHAM. Aleah Guardian., DO   PARoxetine (PAXIL) 40 MG tablet Take 1 tablet by mouth every morning Yes Marita Macdonald. Aleah Nicole., DO   SUMAtriptan (IMITREX) 100 MG tablet  Yes Historical Provider, MD   Misc Natural Products (T-RELIEF CBD+13 SL)  Yes Historical Provider, MD   guaiFENesin (MUCINEX) 600 MG extended release tablet 1-2 tabs every 12 hours as needed for congestion. Increase water intake with this medication. Yes THELMA Do   fluticasone (FLONASE) 50 MCG/ACT nasal spray 2 sprays by Nasal route Yes Historical Provider, MD   SUMAtriptan (IMITREX) 100 MG tablet Take 1 tab at onset of migraine. Can take second tab 2 hours later if needed.  Yes Historical Provider, MD       Allergies:    Latex, Codeine, Lidocaine, Meperidine, Diphenhydramine, Iodinated diagnostic agents, Prednisone, and Trazodone    Family History:       Problem Relation Age of Onset    Depression Mother     Anxiety Disorder Mother     Alzheimer's Disease Mother     Seizures Mother     Alzheimer's Disease Father     Heart Failure Father     Depression Father     Anxiety Disorder Father          Health Maintenance Completed:  Health Maintenance   Topic Date Due    Hepatitis C screen  Never done    COVID-19 Vaccine (1) Never done    HIV screen  Never done    DTaP/Tdap/Td vaccine (1 - Tdap) Never done    Cervical cancer screen  Never done    Colon cancer screen colonoscopy  Never done    Shingles Vaccine (2 of 2) 03/08/2021    Breast cancer screen  03/18/2021    Flu vaccine (Season Ended) 09/01/2021    A1C test (Diabetic or Prediabetic)  02/05/2022    Lipid screen  02/05/2026    Hepatitis A vaccine  Aged Out    Hepatitis B vaccine  Aged Out    Hib vaccine  Aged Out    Meningococcal (ACWY) vaccine  Aged Out    Pneumococcal 0-64 years Vaccine  Aged [employer] History   Administered Date(s) Administered    Zoster Recombinant (Shingrix) 01/11/2021         Review of Systems:  Review of Systems   Constitutional: Negative for chills and fever. Respiratory: Negative for cough and shortness of breath. Cardiovascular: Negative for chest pain and palpitations. Gastrointestinal: Negative for nausea and vomiting. Neurological: Negative for dizziness and weakness. Physical Exam:   Vitals:    06/22/21 0817   BP: 132/78   Pulse: 98   Temp: 97.8 °F (36.6 °C)   TempSrc: Temporal   SpO2: 97%   Weight: 174 lb 3.2 oz (79 kg)   Height: 5' 3.5\" (1.613 m)     Body mass index is 30.37 kg/m². Wt Readings from Last 3 Encounters:   06/22/21 174 lb 3.2 oz (79 kg)   02/05/21 165 lb 6.4 oz (75 kg)   04/27/20 163 lb (73.9 kg)       BP Readings from Last 3 Encounters:   06/22/21 132/78   02/05/21 128/86   02/24/20 108/60       Physical Exam  Constitutional:       Appearance: Normal appearance. She is obese. Cardiovascular:      Rate and Rhythm: Normal rate and regular rhythm. Pulmonary:      Effort: Pulmonary effort is normal.      Breath sounds: Normal breath sounds. Musculoskeletal:         General: No deformity or signs of injury. Normal range of motion. Cervical back: Normal range of motion and neck supple. Neurological:      General: No focal deficit present. Mental Status: She is alert and oriented to person, place, and time. Psychiatric:      Comments: Anxious Appearing, tangential, difficult to follow historian            Assessment/Plan:  Diane Chadwick was seen today for discuss labs. Diagnoses and all orders for this visit:    Degenerative disc disease, lumbar    Anxiety and depression    Coronary artery disease involving native coronary artery of native heart without angina pectoris    Other orders  -     atorvastatin (LIPITOR) 80 MG tablet;  Take 1 tablet by mouth daily  -     aspirin EC 81 MG EC tablet; Take 1 tablet by mouth daily        Health Maintenance Due:  Health Maintenance Due   Topic Date Due    Hepatitis C screen  Never done    COVID-19 Vaccine (1) Never done    HIV screen  Never done    DTaP/Tdap/Td vaccine (1 - Tdap) Never done    Cervical cancer screen  Never done    Colon cancer screen colonoscopy  Never done    Shingles Vaccine (2 of 2) 03/08/2021    Breast cancer screen  03/18/2021      17-year-old  female, ex-smoker quit in 2004 with multiple comorbidities including chronic pain syndrome, degenerative disc disease, anxiety and depression, CAD, IBS, prediabetes, hyperlipidemia, medical noncompliance who presents today for the following. Acute visit to discuss x-rays. See below. DDD L-S Spine. X-rays of LS spine on 3/9/2021 noted severe spondylolysis of L4-L5 which was stable, multilevel facet arthropathy L5-S1 no acute fracture, advised patient she is to continue follow-up with her pain management specialist as well as [address] [place] [address], Dr. Angi Mauricio. Advised patient that I do not have further recommendations for her at this time. Mild bilateral hip OA. [facility] 3/9/2021 noted mild bilateral hip OA. Again advised her to follow-up with orthopedic surgeon, recommend the importance of weight loss as well as physical therapy. She is welcome to use NSAIDs as tolerated. With the proper GI and  precautions. CAD. Coronary artery disease noted on x-ray from 3/9/2021. Counseled her on these findings. Encourage risk modification including weight loss, told her on statin therapy, recommend initiation of atorvastatin 80 mg nightly. Counseled on risks and benefits. Return to clinic in months to recheck lipid profile to assess for LDL reduction. Prediabetes. Hemoglobin A1c 5.7% on 2/5/2021. Counseled patient on these findings.   Recommend the importance of diabetic diet, increase in exercise level and caloric restriction. Recommend rechecking hemoglobin A1c within 6 months for consideration of initiation of Metformin if warranted. Anxiety and depression. Per visit anxiety does not appear to be well controlled today as patient was extremely anxious discussing pain management. Continue paroxetine 40 mg daily. Counseled on the risks of chronic benzodiazepine use at which I had recommended weaning off alprazolam but patient wishes to discuss with PCP. Recommend consideration of other anxiolytics versus referral to psychiatry. Chronic pain syndrome. Patient extremely anxious today with discussing pain management. Encouraged the importance of following up with pain management as referred by Dr. Krista Zamorano previously. Medical noncompliance. Patient currently declining hep C screening, HIV screening, Covid vaccine, DTaP vaccine, Pap smear, colonoscopy, shingles vaccine, mammogram.  Encouraged and counseled on the importance of having this completed and counseled her on the risks of missed diagnosis up to and including death. Obesity. BMI 30.37. Encouraged the importance of caloric restriction and increase in exercise level. Seasonal allergies. Well-controlled. Continue fluticasone and over-the-counter antihistamine as needed. Return to clinic within 3 months for chronic care management,   - advised patient to continue follow-up with pain management for pain issues,   - recommend consideration of referral to psychiatry and weaning off alprazolam,  - follow-up on initiation of atorvastatin 80 mg nightly and recommend rechecking lipid profile to assess for LDL reduction,   - recommend once again addressing care gaps  - pt may benefit from routine q monthly visits        Return in about 1 month (around 7/22/2021) for Dr. Krista Zamorano [facility].      MA Note Attestation: *I have reviewed the chief complaint and history of present illness (including ROS and PFSH) and vital documentation by my staff and I

## 2021-06-28 ENCOUNTER — TELEPHONE (OUTPATIENT)
Dept: PRIMARY CARE CLINIC | Age: 56
End: 2021-06-28

## 2021-06-28 NOTE — TELEPHONE ENCOUNTER
Per lab results note  Cholesterol elevated but do not recommend medication.   The 10-year ASCVD risk score (Isabella Gutiérrez, et al., 2013) is: 2.3%    Values used to calculate the score:      Age: 60 years      Sex: Female      Is Non- : No      Diabetic: No      Tobacco smoker: No      Systolic Blood Pressure: 069 mmHg      Is BP treated: No      HDL Cholesterol: 68 mg/dL      Total Cholesterol: 217 mg/dL    No red flag issue based on labs; pt can make appointment to discuss further

## 2021-06-28 NOTE — TELEPHONE ENCOUNTER
Called and spoke with pt, she informed me that she ct pelvis done recently and it showed that her artery was full. Patient's main concern is that the blockage came from her smoking or from all the injections that she has had done in the past for her back. Pt is worried and would just like some feed back from pcp.     Please advise

## 2021-07-06 DIAGNOSIS — M16.10 HIP ARTHRITIS: Primary | ICD-10-CM

## 2021-07-06 RX ORDER — NAPROXEN 500 MG/1
500 TABLET ORAL 2 TIMES DAILY WITH MEALS
Qty: 60 TABLET | Refills: 0 | Status: SHIPPED | OUTPATIENT
Start: 2021-07-06 | End: 2021-07-13

## 2021-07-06 NOTE — TELEPHONE ENCOUNTER
Pt is calling asking for high dose naproxen to be called in to the pharmacy for the inflammation in her hip. She is seeing a doctor for her hip pain on the 22nd and has been having SI Joint injections.  Please advise

## 2021-07-06 NOTE — TELEPHONE ENCOUNTER
Naproxen sent  1. Hip arthritis    - naproxen (NAPROSYN) 500 MG tablet; Take 1 tablet by mouth 2 times daily (with meals)  Dispense: 60 tablet;  Refill: 0

## 2021-07-13 ENCOUNTER — OFFICE VISIT (OUTPATIENT)
Dept: PRIMARY CARE CLINIC | Age: 56
End: 2021-07-13
Payer: COMMERCIAL

## 2021-07-13 VITALS
TEMPERATURE: 98.4 F | DIASTOLIC BLOOD PRESSURE: 78 MMHG | HEIGHT: 64 IN | BODY MASS INDEX: 29.33 KG/M2 | HEART RATE: 82 BPM | WEIGHT: 171.8 LBS | OXYGEN SATURATION: 99 % | SYSTOLIC BLOOD PRESSURE: 138 MMHG

## 2021-07-13 DIAGNOSIS — M25.551 PAIN OF BOTH HIP JOINTS: ICD-10-CM

## 2021-07-13 DIAGNOSIS — M54.16 LUMBAR RADICULOPATHY: Primary | ICD-10-CM

## 2021-07-13 DIAGNOSIS — M25.552 PAIN OF BOTH HIP JOINTS: ICD-10-CM

## 2021-07-13 DIAGNOSIS — Z79.899 CHRONICALLY ON BENZODIAZEPINE THERAPY: ICD-10-CM

## 2021-07-13 DIAGNOSIS — F32.A ANXIETY AND DEPRESSION: ICD-10-CM

## 2021-07-13 DIAGNOSIS — M51.36 DEGENERATIVE DISC DISEASE, LUMBAR: ICD-10-CM

## 2021-07-13 DIAGNOSIS — F51.04 PSYCHOPHYSIOLOGICAL INSOMNIA: ICD-10-CM

## 2021-07-13 DIAGNOSIS — F41.9 ANXIETY AND DEPRESSION: ICD-10-CM

## 2021-07-13 DIAGNOSIS — M79.7 FIBROMYALGIA: ICD-10-CM

## 2021-07-13 DIAGNOSIS — F43.10 POST TRAUMATIC STRESS DISORDER: ICD-10-CM

## 2021-07-13 PROBLEM — I25.10 CORONARY ARTERY DISEASE INVOLVING NATIVE CORONARY ARTERY WITHOUT ANGINA PECTORIS: Status: RESOLVED | Noted: 2021-06-22 | Resolved: 2021-07-13

## 2021-07-13 PROBLEM — M54.50 LOW BACK PAIN: Status: RESOLVED | Noted: 2019-10-22 | Resolved: 2021-07-13

## 2021-07-13 PROCEDURE — 99214 OFFICE O/P EST MOD 30 MIN: CPT | Performed by: FAMILY MEDICINE

## 2021-07-13 ASSESSMENT — ENCOUNTER SYMPTOMS
SHORTNESS OF BREATH: 1
BACK PAIN: 1

## 2021-07-13 NOTE — PROGRESS NOTES
Chiara Echevarria     1965    Consultants:  Patient Care Team:  Rita Pena. Stephen Mora DO as PCP - General (Family Medicine)  Maricruz Masterson MD as PCP - OBGYN (Obstetrics & Gynecology)  Rita Pena.  Stephen Mora DO as PCP - [facility][1] Empaneled Provider    Chief Complaint:     Chief Complaint   Patient presents with    Results     PFT's and 6 minute walk test    Circulatory Problem     pt states that imaging showed plaques in arterties/aorta    Back Pain    Hip Pain    Breathing Problem    Anxiety    Trauma    Insomnia    Health Maintenance     did not have time to discuss further today     HPI:  Chiara Echevarria is a 60 y.o. female  established patient who presents for follow up appointment about results for imaging, pulmonary function testing, anxiety, trauma,     Pt questions about vascular disease and treatment:  -Cholesterol panel, total cholesterol Total elevated, LDL elevated  NO indication for statin based off of lab work/ASCVD risk score  Pt had 3D CT scan of pelvis zooming in on SI joints 3/9/21 showed spondyosis of L4-L5 stable, LBS1 w/o  Hip fracture  pe patient/last provider note, no record for review today, the x ray and CTfound cardiac arterial and abdominal aortic plaque   Lab Results   Component Value Date    CHOL 217 (H) 02/05/2021     Lab Results   Component Value Date    TRIG 180 (H) 02/05/2021     Lab Results   Component Value Date    HDL 68 (H) 02/05/2021     Lab Results   Component Value Date    LDLCALC 113 (H) 02/05/2021     Lab Results   Component Value Date    LABVLDL 36 02/05/2021     No results found for: CHOLHDLRATIO    The 10-year ASCVD risk score (Alex Hutton et al., 2013) is: 2.5%    Values used to calculate the score:      Age: 60 years      Sex: Female      Is Non- : No      Diabetic: No      Tobacco smoker: No      Systolic Blood Pressure: 835 mmHg      Is BP treated: No      HDL Cholesterol: 68 mg/dL      Total Cholesterol: 217 mg/dL    -Chronic Low Low back pain    Coronary artery disease involving native coronary artery without angina pectoris         Past Medical History:    Past Medical History:   Diagnosis Date    Anxiety and depression     Degenerative disc disease, lumbar     IBS (irritable bowel syndrome)     Insomnia     Lumbar disc disease     Migraines     Post traumatic stress disorder        Past Surgical History:  Past Surgical History:   Procedure Laterality Date    HEEL SPUR SURGERY      LUMBAR DISCECTOMY      TOENAIL EXCISION      TONSILLECTOMY      TUBAL LIGATION         Home Meds:  Prior to Visit Medications    Medication Sig Taking? Authorizing Provider   ALPRAZolam (XANAX) 2 MG tablet TAKE 1/2 TO 1 TABLET BY MOUTH EVERY NIGHT AS NEEDED FOR SLEEP Yes Magi Lei., DO   PARoxetine (PAXIL) 40 MG tablet Take 1 tablet by mouth every morning Yes Balwinder Fearing. Joshua Medina, DO   SUMAtriptan (IMITREX) 100 MG tablet  Yes Historical Provider, MD   SUMAtriptan (IMITREX) 100 MG tablet Take 1 tab at onset of migraine. Can take second tab 2 hours later if needed. Yes Historical Provider, MD   naproxen (NAPROSYN) 500 MG tablet Take 1 tablet by mouth 2 times daily (with meals)  Patient not taking: Reported on 7/13/2021  Balwinder Fearing. Joshua Medina, DO   atorvastatin (LIPITOR) 80 MG tablet Take 1 tablet by mouth daily  Patient not taking: Reported on 7/13/2021  Blanca De Jesus MD   aspirin EC 81 MG EC tablet Take 1 tablet by mouth daily  Patient not taking: Reported on 7/13/2021  Blanca De Jesus MD   Misc Natural Products (T-RELIEF CBD+13 SL)   Historical Provider, MD   guaiFENesin (MUCINEX) 600 MG extended release tablet 1-2 tabs every 12 hours as needed for congestion. Increase water intake with this medication.   Patient not taking: Reported on 7/13/2021  THELMA Silva   fluticasone [facility]) 50 MCG/ACT nasal spray 2 sprays by Nasal route  Patient not taking: Reported on 7/13/2021  Historical Provider, MD       Allergies:    Latex, Codeine, Lidocaine, Meperidine, Diphenhydramine, Iodinated diagnostic agents, Prednisone, and Trazodone    Family History:       Problem Relation Age of Onset    Depression Mother     Anxiety Disorder Mother     Alzheimer's Disease Mother     Seizures Mother     Alzheimer's Disease Father     Heart Failure Father     Depression Father     Anxiety Disorder Father        Social History  Social History     Socioeconomic History    Marital status:      Spouse name: Mili Newman    Number of children: 1    Years of education: 15    Highest education level: 11th grade   Occupational History    Not on file   Tobacco Use    Smoking status: Former Smoker     Packs/day: 1.00     Years: 30.00     Pack years: 30.00     Types: Cigarettes     Start date: 1974     Quit date: 2004     Years since quittin.5    Smokeless tobacco: Never Used   Vaping Use    Vaping Use: Never used   Substance and Sexual Activity    Alcohol use: Not Currently    Drug use: Not Currently    Sexual activity: Yes     Partners: Male     Birth control/protection: Surgical   Other Topics Concern    Not on file   Social History Narrative    Suicide in family    [de-identified], aunts, cousin    Other cousin 4 attempts    Sister made 3 attempts    Relatives committed to [facility]     38 years. With  39 years    Son 39 y/o [address] [facility] Determinants of Health     Financial Resource Strain:     Difficulty of Paying Living Expenses:    Food Insecurity:     Worried About [address] in the Last Year:     [address] in the Last Year:    Transportation Needs:     Lack of Transportation (Medical):      Lack of Transportation (Non-Medical):    Physical Activity:     Days of Exercise per Week:     Minutes of Exercise per Session:    Stress:     Feeling of Stress :    Social Connections:     Frequency of Communication with Friends and Family:     Frequency of Social Gatherings with Friends and Family: Extraocular movements intact. Pulmonary:      Effort: Pulmonary effort is normal.   Neurological:      General: No focal deficit present. Mental Status: She is alert and oriented to person, place, and time. Mental status is at baseline. Psychiatric:         Attention and Perception: She is inattentive. Mood and Affect: Mood is anxious. Affect is blunt. Speech: Speech is rapid and pressured and tangential.         Behavior: Behavior is hyperactive. Thought Content: Thought content normal.         Judgment: Judgment is impulsive and inappropriate.         Lab Review:  The 10-year ASCVD risk score (Ayah Bailon et al., 2013) is: 2.5%    Values used to calculate the score:      Age: 60 years      Sex: Female      Is Non- : No      Diabetic: No      Tobacco smoker: No      Systolic Blood Pressure: 578 mmHg      Is BP treated: No      HDL Cholesterol: 68 mg/dL      Total Cholesterol: 217 mg/dL  Lab Results   Component Value Date    CHOL 217 (H) 02/05/2021     Lab Results   Component Value Date    TRIG 180 (H) 02/05/2021     Lab Results   Component Value Date    HDL 68 (H) 02/05/2021     Lab Results   Component Value Date    LDLCALC 113 (H) 02/05/2021     Lab Results   Component Value Date    LABVLDL 36 02/05/2021     No results found for: [facility]       Lab Results   Component Value Date     02/05/2021    K 5.0 02/05/2021     02/05/2021    CO2 26 02/05/2021    BUN 8 02/05/2021    CREATININE 0.8 02/05/2021    GLUCOSE 104 (H) 02/05/2021    CALCIUM 10.1 02/05/2021    PROT 7.8 02/05/2021    LABALBU 4.6 02/05/2021    BILITOT 0.5 02/05/2021    ALKPHOS 83 02/05/2021    AST 20 02/05/2021    ALT 19 02/05/2021    LABGLOM >60 02/05/2021    GFRAA >60 02/05/2021    AGRATIO 1.4 02/05/2021    GLOB 3.2 02/05/2021     Lab Results   Component Value Date    LABA1C 5.7 02/05/2021     Lab Results   Component Value Date    .9 02/05/2021         Lab Results Component Value Date    WBC 6.0 02/05/2021    HGB 14.2 02/05/2021    HCT 43.4 02/05/2021    MCV 92.1 02/05/2021     02/05/2021       Assessment/Plan:  Joann Cochran is 59 y/o female who was seen today for results, circulatory problem, back pain, hip pain, breathing problem, anxiety, trauma, insomnia and health maintenance. 1. Lumbar radiculopathy  2. Degenerative disc disease, lumbar  3. Pain of both hip joints  4. Fibromyalgia  -pt followed by pain management/orhtho/neuro spine surgery  -has had a number of back injections/procedures. Pt discusses this history and treatments at length every appointment, see previous OV notes 2/24/20, 4/27/20, 9/14/20, 12/14/20, 2/5/21, and today 7/13/21. ..  -We discussed how it may not be best use of her appointment time to recap her entire chronic back and hip pain history, but rather we should focus on her health maintenance needs and other chronic problems, as the back pain specialists and hip pain specialists were managing her pain. ..  -Pt with limited insight into why I might think it inappropriate to readdress her back/hip pain when it is being managed elsewhere    5. Post traumatic stress disorder  6. Anxiety and depression  7. Psychophysiological insomnia  8.  Chronically on benzodiazepine therapy  -Paxil 40 mg tablet daily  -Xanax 2 mg HS for sleep/anxiety  -Xanax was titrated up in past at pt request, recommend wean off/discontinuation  -Pt states she will find new doctor due to her subjective need for benzodiazepine therapy to treat her anxiety/PTSD/Insomnia  -I am willing to  help pt wean off of benzodiazepine therapy as patient will be dismissed from office    PDMP Monitoring:    Last PDMP Timothy Neighbours as Reviewed [facility]):  Review User Review Instant Review Result   Brenda Olsen 7/13/2021  3:10 PM Reviewed PDMP [1]     Last Controlled Substance Monitoring Documentation      Refill from 3/12/2021 in [facility] days.    Spent 30-39 minutes of face to face interaction with patient counseling on diagnoses and treatment plan; including but not limited to pre visit planning, chart/lab review, new orders, instructions, charting      EMR Dragon/transcription disclaimer:  Much of this encounter note is electronic transcription/translation of spoken language to printed texts. The electronic translation of spoken language may be erroneous, or at times, nonsensical words or phrases may be inadvertently transcribed. Although I have reviewed the note for such errors, some may still exist.       Rick Srivastava.  Karey Hankins., DO     7/13/2021

## 2021-07-28 ENCOUNTER — TELEPHONE (OUTPATIENT)
Dept: PRIMARY CARE CLINIC | Age: 56
End: 2021-07-28

## 2021-07-28 NOTE — TELEPHONE ENCOUNTER
Patient called Edvin Lovshahram over being dismissed from the office states that we are all crazy and it is all my fault. And that it is illegal to dismiss her and not supply her all of her medications. She states that she will be hiring a  and they will take care of it. She was wanting her medical records I explained that a medical records release form needed to be completed and filled out, that she could locate this on her [employer] or on [employer] website. She continued to be very loud about how crazy we are and I am. She started to curse I asked her to please stop cursing or I would disconnect the call. She then told me she was on [address] and [address] her way here I explained that I could mail it to her home and she said no she was coming here to deal with us. And she then disconnected the phone.         DOROTEO

## 2021-07-31 DIAGNOSIS — F32.A ANXIETY AND DEPRESSION: ICD-10-CM

## 2021-07-31 DIAGNOSIS — F41.9 ANXIETY AND DEPRESSION: ICD-10-CM

## 2021-07-31 DIAGNOSIS — F51.04 PSYCHOPHYSIOLOGICAL INSOMNIA: ICD-10-CM

## 2021-07-31 RX ORDER — ALPRAZOLAM 2 MG/1
TABLET ORAL
Qty: 30 TABLET | OUTPATIENT
Start: 2021-07-31

## 2021-08-03 DIAGNOSIS — M16.10 HIP ARTHRITIS: ICD-10-CM

## 2021-08-03 RX ORDER — NAPROXEN 500 MG/1
TABLET ORAL
Qty: 60 TABLET | Refills: 0 | OUTPATIENT
Start: 2021-08-03

## 2021-09-08 DIAGNOSIS — F32.A ANXIETY AND DEPRESSION: ICD-10-CM

## 2021-09-08 DIAGNOSIS — F41.9 ANXIETY AND DEPRESSION: ICD-10-CM

## 2021-09-08 DIAGNOSIS — F51.04 PSYCHOPHYSIOLOGICAL INSOMNIA: ICD-10-CM

## 2021-09-08 DIAGNOSIS — M79.7 FIBROMYALGIA: ICD-10-CM

## 2021-09-08 DIAGNOSIS — Z13.31 POSITIVE DEPRESSION SCREENING: ICD-10-CM

## 2021-09-08 RX ORDER — PAROXETINE HYDROCHLORIDE 40 MG/1
40 TABLET, FILM COATED ORAL EVERY MORNING
Qty: 90 TABLET | Refills: 1 | OUTPATIENT
Start: 2021-09-08

## 2022-10-07 ENCOUNTER — HOSPITAL ENCOUNTER (OUTPATIENT)
Dept: WOMENS IMAGING | Age: 57
End: 2022-10-07
Payer: COMMERCIAL

## 2022-10-07 ENCOUNTER — HOSPITAL ENCOUNTER (OUTPATIENT)
Dept: WOMENS IMAGING | Age: 57
Discharge: HOME | End: 2022-10-07
Payer: COMMERCIAL

## 2022-10-07 VITALS — BODY MASS INDEX: 30.48 KG/M2 | HEIGHT: 63 IN | WEIGHT: 172 LBS

## 2022-10-07 DIAGNOSIS — N64.4 MASTODYNIA: ICD-10-CM

## 2022-10-07 PROCEDURE — G0279 TOMOSYNTHESIS, MAMMO: HCPCS

## 2023-12-01 ENCOUNTER — TELEPHONE (OUTPATIENT)
Dept: ORTHOPEDIC SURGERY | Age: 58
End: 2023-12-01

## 2023-12-01 NOTE — TELEPHONE ENCOUNTER
No answer., HIPAA Checked. , and Left message with details. LVM- Dr. Flores Dick is Ortho trauma as well as Foot and Ankle. There are provider who are more specialized int the sift tissues of the hip that may be more suitable to see the patient.

## 2023-12-01 NOTE — TELEPHONE ENCOUNTER
General Question     Subject: Patient called and she would be and new patient she is calling regarding her hips she stated she is having some problems with soft tissues in her hips and she just would like to know if Dr. Justice Marin would be able to see her or help her with that problem. She just need a call back. Please Advise.   Patient Olinda Freeman  Contact Number: [phone]

## 2025-03-04 NOTE — TELEPHONE ENCOUNTER
Please advise Start/ continue multivitamin containing vitamin D, vitamin C and Zinc to help boost the immune system.  Use Coricidin HBP products as per package instructions instead of products containing Sudafed due to elevated blood pressures. Start benzonatate up to 3 times daily for cough, especially use at night to aid with sleep.     Home Care:  -Rest at home and drink plenty of fluids to avoid dehydration.  -Children:  Use acetaminophen (Tylenol) for fever, fussiness or discomfort. In infants over six months of age, you may use ibuprofen (Children's Motrin) instead of Tylenol.  [NOTE:  If your child has chronic liver or kidney disease or ever had a stomach ulcer or GI bleeding, talk with your child's doctor before using these medicines.]  (Aspirin should never be used in anyone under 18 years of age who is ill with a fever.  It may cause severe liver damage.)  -Adults:  You may use acetaminophen (Tylenol) or ibuprofen (Motrin, Advil) to control pain or fever, unless another medicine was prescribed for this.  [NOTE:  If you have chronic liver or kidney disease or ever had a stomach ulcer or GI bleeding, talk with your doctor before using these medicines.]  -Throat lozenges or sprays (Chloraseptic and others) will reduce pain. Gargling with warm salt water will also reduce throat pain.  Dissolve 1/2 teaspoon of salt in 1 glass of warm water.  This is especially useful just before meals.  Tea with honey can help with pain.    -Use honey to relieve cough.  DO NOT GIVE HONEY TO AN INFANT YOUNGER THAN 1.   -Vaporizer in room at night during the winter months helps.  -While you may have soreness with eating try softer foods or liquids such as soup.  Popsicles can help temporarily with discomfort.   -Make sure to keep well hydrated.  It is important to keep taking in fluids even if you do not feel like drinking/eating, to prevent dehydration.     -Can continue OTC medications for symptom relief.   -Use a cool mist vaporizer or  saline nasal spray to relieve congestion.   -Can try OTC Flonase for nasal congestion per package instructions as needed.   -Can try OTC Sudafed (decongestant) per package instructions as needed.    -Can try OTC Dextromethorphan (cough suppressant) such as Robitussin DM as needed per package instructions.  -Can try OTC Mucinex Maximum Strength (guaifenesin ER) twice daily with at least 64 ounces of non-carbonated, decaffeinated, non-alcoholic fluids. Take in morning with breakfast and with dinner but at least 3-4 hours prior to bedtime to prevent keeping you up at night. Do not cut, crush or chew maximum strength tablets.    -Can try OTC Tylenol or Ibuprofen for fever or pain as needed per package instructions.  -Make sure to use any OTC medications per package instructions.     Patient Education     Viral Upper Respiratory Illness (Adult)    You have a viral upper respiratory illness (URI), which is another term for the common cold. This illness is contagious during the first few days. It is spread through the air by coughing and sneezing. It may also be spread by direct contact (touching the sick person and then touching your own eyes, nose, or mouth). Frequent handwashing will decrease risk of spread. Most viral illnesses go away within 7 to 10 days with rest and simple home remedies. Sometimes the illness may last for several weeks. Antibiotics will not kill a virus, and they are generally not prescribed for this condition.  Home care  If symptoms are severe, rest at home for the first 2 to 3 days. When you resume activity, don't let yourself get too tired.  Don't smoke. If you need help stopping, talk with your healthcare provider.  Avoid being exposed to cigarette smoke (yours or others’).  You may use acetaminophen or ibuprofen to control pain and fever, unless another medicine was prescribed. If you have chronic liver or kidney disease, have ever had a stomach ulcer or gastrointestinal bleeding, or are  taking blood-thinning medicines, talk with your healthcare provider before using these medicines. Aspirin should never be given to anyone under 18 years of age who is ill with a viral infection or fever. It may cause severe liver or brain damage.  Your appetite may be poor, so a light diet is fine. Stay well hydrated by drinking 6 to 8 glasses of fluids per day (water, soft drinks, juices, tea, or soup). Extra fluids will help loosen secretions in the nose and lungs.  Over-the-counter cold medicines will not shorten the length of time you’re sick, but they may be helpful for the following symptoms: cough, sore throat, and nasal and sinus congestion. If you take prescription medicines, ask your healthcare provider or pharmacist which over-the-counter medicines are safe to use. (Note: Don't use decongestants if you have high blood pressure.)  Follow-up care  Follow up with your healthcare provider, or as advised.  When to seek medical advice  Call your healthcare provider right away if any of these occur:  Cough with lots of colored sputum (mucus)  Severe headache; face, neck, or ear pain  Difficulty swallowing due to throat pain  Fever of 100.4°F (38°C) or higher, or as directed by your healthcare provider  Call 911  Call 911 if any of these occur:  Chest pain, shortness of breath, wheezing, or difficulty breathing  Coughing up blood  Very severe pain with swallowing, especially if it goes along with a muffled voice   [employer] last reviewed this educational content on 6/1/2018 © 2000-2021 [employer]. All rights reserved. This information is not intended as a substitute for professional medical care. Always follow your healthcare professional's instructions.         Information your provider wants you to know    Your opinion matters! Thank you for choosing [facility] at [facility] for your care today. You might receive a survey via e-mail, via text message,  or in your [employer] account about your experience today to evaluate our clinic. Please include comments and feedback to our office on how we can improve. It was a pleasure to care for you today!    Clinic Policy:    [facility] at [facility] in [place] regular office hours:    Monday 8 am to 6 pm    Tuesday 9 am to 6 pm    Wednesday 7 am to 1 pm    Thursday  9 am to 6 pm    Friday  7 am to 1 pm    Cancellation and No Show: If you must cancel a visit, please give 24 hours' notice so we can accommodate other patients waiting to be seen. As a courtesy our automated system will place a reminder call to you 48 hours prior to your appointment time. Any appointment cancelled less than 2 hours prior to start time will be considered a “No Show”.  You can cancel your appointment by calling our office at [phone] anytime or online via your [employer] account.     We see ages 2 and above. You do not need to be an [facility] patient to be seen at [facility]. Appointments are confidential are will not be shared with your employer.    Scope of Services   Treatment & Management of:   Cough/ Upper Respiratory Infection/ Sinus Infection  Ear Infection  Fever  Nausea/ Vomiting/ Diarrhea  Seasonal Allergies  Urinary Symptoms and infections (includes basic urinalysis, if indicated)  Sore throat (includes rapid strep test, if indicated)  Ear Wax removal (includes flushing of ears, if indicated)  Rashes/ Acne/ Minor Skin Infections  Low Back Pain (non-work related)  Eye Infections  Weight Loss  Fatigue  Smoking Cessation  Wart Removal (excludes facial; includes use of liquid nitrogen)   Sports and Camp physicals  TB Skin Test  Pregnancy Test  Birth Control (including Depo-Provera shots)  Corneal Abrasion Evaluation (surface eye scratch)  Sexually Transmitted Infection Testing  COVID-19 Testing  Non-Work Related Minor Injuries (I.e. sprained ankle, tendonitis, knee pain,  plantar fasciitis)  Chronic Condition Support: Asthma, Diabetes, High Blood Pressure and Cholesterol, Thyroid, Anxiety/ Depression  Vaccines such as Tdap, Gardasil 9, Prevnar, and Shingrix    Medication Requests:    Please plan ahead. We need up to 2 business days' notice for refills to be completed. Please send office a message via your [employer] account or contact your preferred pharmacy for your refills. The pharmacy will then contact the office for the refills.     Messages:    Messages left for [facility] will be returned within 24 business hours or sooner.     Test results:    Our goal is to report all test results within 7 days of test completion. If you do not receive your test results either by phone, [employer] message, or letter within 7 days after completion of your test, please call our office at [phone] or send a message via your [employer] account.